# Patient Record
Sex: FEMALE | Race: BLACK OR AFRICAN AMERICAN | NOT HISPANIC OR LATINO | Employment: UNEMPLOYED | URBAN - METROPOLITAN AREA
[De-identification: names, ages, dates, MRNs, and addresses within clinical notes are randomized per-mention and may not be internally consistent; named-entity substitution may affect disease eponyms.]

---

## 2023-03-28 LAB
ALANINE AMINOTRANSFERASE (SGPT) (U/L) IN SER/PLAS: 10 U/L (ref 7–45)
ALBUMIN (G/DL) IN SER/PLAS: 4.2 G/DL (ref 3.4–5)
ALKALINE PHOSPHATASE (U/L) IN SER/PLAS: 101 U/L (ref 33–110)
ANION GAP IN SER/PLAS: 13 MMOL/L (ref 10–20)
ASPARTATE AMINOTRANSFERASE (SGOT) (U/L) IN SER/PLAS: 14 U/L (ref 9–39)
BILIRUBIN TOTAL (MG/DL) IN SER/PLAS: 0.3 MG/DL (ref 0–1.2)
CALCIUM (MG/DL) IN SER/PLAS: 9.4 MG/DL (ref 8.6–10.6)
CARBON DIOXIDE, TOTAL (MMOL/L) IN SER/PLAS: 27 MMOL/L (ref 21–32)
CHLORIDE (MMOL/L) IN SER/PLAS: 104 MMOL/L (ref 98–107)
CHOLESTEROL (MG/DL) IN SER/PLAS: 163 MG/DL (ref 0–199)
CHOLESTEROL IN HDL (MG/DL) IN SER/PLAS: 44.1 MG/DL
CHOLESTEROL/HDL RATIO: 3.7
CREATININE (MG/DL) IN SER/PLAS: 0.65 MG/DL (ref 0.5–1.05)
ERYTHROCYTE DISTRIBUTION WIDTH (RATIO) BY AUTOMATED COUNT: 13.6 % (ref 11.5–14.5)
ERYTHROCYTE MEAN CORPUSCULAR HEMOGLOBIN CONCENTRATION (G/DL) BY AUTOMATED: 30.9 G/DL (ref 32–36)
ERYTHROCYTE MEAN CORPUSCULAR VOLUME (FL) BY AUTOMATED COUNT: 93 FL (ref 80–100)
ERYTHROCYTES (10*6/UL) IN BLOOD BY AUTOMATED COUNT: 4.21 X10E12/L (ref 4–5.2)
ESTIMATED AVERAGE GLUCOSE FOR HBA1C: 134 MG/DL
GFR FEMALE: >90 ML/MIN/1.73M2
GLUCOSE (MG/DL) IN SER/PLAS: 102 MG/DL (ref 74–99)
HEMATOCRIT (%) IN BLOOD BY AUTOMATED COUNT: 39.1 % (ref 36–46)
HEMOGLOBIN (G/DL) IN BLOOD: 12.1 G/DL (ref 12–16)
HEMOGLOBIN A1C/HEMOGLOBIN TOTAL IN BLOOD: 6.3 %
HEPATITIS C VIRUS AB PRESENCE IN SERUM: NONREACTIVE
HIV 1/ 2 AG/AB SCREEN: NONREACTIVE
LDL: 100 MG/DL (ref 0–99)
LEUKOCYTES (10*3/UL) IN BLOOD BY AUTOMATED COUNT: 6.1 X10E9/L (ref 4.4–11.3)
NRBC (PER 100 WBCS) BY AUTOMATED COUNT: 0 /100 WBC (ref 0–0)
PLATELETS (10*3/UL) IN BLOOD AUTOMATED COUNT: 379 X10E9/L (ref 150–450)
POTASSIUM (MMOL/L) IN SER/PLAS: 4.1 MMOL/L (ref 3.5–5.3)
PROTEIN TOTAL: 7.6 G/DL (ref 6.4–8.2)
SODIUM (MMOL/L) IN SER/PLAS: 140 MMOL/L (ref 136–145)
TRIGLYCERIDE (MG/DL) IN SER/PLAS: 96 MG/DL (ref 0–149)
UREA NITROGEN (MG/DL) IN SER/PLAS: 6 MG/DL (ref 6–23)
VLDL: 19 MG/DL (ref 0–40)

## 2023-09-28 PROBLEM — I63.512 ACUTE ISCHEMIC LEFT MCA STROKE (MULTI): Status: ACTIVE | Noted: 2023-09-28

## 2023-09-28 PROBLEM — F17.200 TOBACCO USE DISORDER: Status: ACTIVE | Noted: 2023-09-28

## 2023-09-28 PROBLEM — G57.12 MERALGIA PARESTHETICA OF LEFT SIDE: Status: ACTIVE | Noted: 2023-09-28

## 2023-09-28 PROBLEM — R43.2 LOSS OF TASTE: Status: ACTIVE | Noted: 2023-09-28

## 2023-09-28 PROBLEM — I69.398 GAIT DISTURBANCE, POST-STROKE: Status: ACTIVE | Noted: 2023-09-28

## 2023-09-28 PROBLEM — Z12.11 ENCOUNTER FOR SCREENING COLONOSCOPY: Status: ACTIVE | Noted: 2023-09-28

## 2023-09-28 PROBLEM — R21 SKIN RASH: Status: ACTIVE | Noted: 2023-09-28

## 2023-09-28 PROBLEM — M79.2 PAIN, NEUROPATHIC: Status: ACTIVE | Noted: 2023-09-28

## 2023-09-28 PROBLEM — M62.838 MUSCLE SPASM, NOCTURNAL: Status: ACTIVE | Noted: 2023-09-28

## 2023-09-28 PROBLEM — R73.03 PREDIABETES: Status: ACTIVE | Noted: 2023-09-28

## 2023-09-28 PROBLEM — I10 ESSENTIAL HYPERTENSION: Status: ACTIVE | Noted: 2023-09-28

## 2023-09-28 PROBLEM — K21.9 GERD (GASTROESOPHAGEAL REFLUX DISEASE): Status: ACTIVE | Noted: 2023-09-28

## 2023-09-28 PROBLEM — N91.2 AMENORRHEA: Status: ACTIVE | Noted: 2023-09-28

## 2023-09-28 PROBLEM — R26.9 GAIT DISTURBANCE, POST-STROKE: Status: ACTIVE | Noted: 2023-09-28

## 2023-09-28 PROBLEM — F11.90 OPIOID USE DISORDER: Status: ACTIVE | Noted: 2023-09-28

## 2023-09-28 PROBLEM — I69.920 APHASIA DUE TO LATE EFFECTS OF CEREBROVASCULAR DISEASE: Status: ACTIVE | Noted: 2023-09-28

## 2023-09-28 PROBLEM — M25.562 PAIN IN BOTH KNEES: Status: ACTIVE | Noted: 2023-09-28

## 2023-09-28 PROBLEM — R05.9 COUGH: Status: ACTIVE | Noted: 2023-09-28

## 2023-09-28 PROBLEM — M25.511 SHOULDER PAIN, BILATERAL: Status: ACTIVE | Noted: 2023-09-28

## 2023-09-28 PROBLEM — E78.5 DYSLIPIDEMIA: Status: ACTIVE | Noted: 2023-09-28

## 2023-09-28 PROBLEM — M25.561 PAIN IN BOTH KNEES: Status: ACTIVE | Noted: 2023-09-28

## 2023-09-28 PROBLEM — M54.50 BILATERAL LOW BACK PAIN: Status: ACTIVE | Noted: 2023-09-28

## 2023-09-28 PROBLEM — F41.0 PANIC ATTACKS: Status: ACTIVE | Noted: 2023-09-28

## 2023-09-28 PROBLEM — D68.59 HYPERCOAGULOPATHY (MULTI): Status: ACTIVE | Noted: 2023-09-28

## 2023-09-28 PROBLEM — R43.0 LOSS OF SMELL: Status: ACTIVE | Noted: 2023-09-28

## 2023-09-28 PROBLEM — I69.359 HEMIPARESIS AFFECTING DOMINANT SIDE AS LATE EFFECT OF STROKE (MULTI): Status: ACTIVE | Noted: 2023-09-28

## 2023-09-28 PROBLEM — F41.9 ANXIETY: Status: ACTIVE | Noted: 2023-09-28

## 2023-09-28 PROBLEM — M25.512 SHOULDER PAIN, BILATERAL: Status: ACTIVE | Noted: 2023-09-28

## 2023-09-28 PROBLEM — N64.4 BREAST PAIN: Status: ACTIVE | Noted: 2023-09-28

## 2023-09-28 RX ORDER — IBUPROFEN 200 MG
1 TABLET ORAL EVERY 24 HOURS
COMMUNITY
Start: 2020-10-22

## 2023-09-28 RX ORDER — FOLIC ACID 1 MG/1
1 TABLET ORAL DAILY
COMMUNITY
Start: 2022-06-13 | End: 2023-10-09 | Stop reason: SDUPTHER

## 2023-09-28 RX ORDER — BUPRENORPHINE HYDROCHLORIDE AND NALOXONE HYDROCHLORIDE DIHYDRATE 8; 2 MG/1; MG/1
1 TABLET SUBLINGUAL 2 TIMES DAILY
COMMUNITY

## 2023-09-28 RX ORDER — MICONAZOLE NITRATE 2 %
2 CREAM (GRAM) TOPICAL AS NEEDED
COMMUNITY
Start: 2021-04-30

## 2023-09-28 RX ORDER — ATORVASTATIN CALCIUM 40 MG/1
1 TABLET, FILM COATED ORAL DAILY
COMMUNITY
End: 2023-10-09 | Stop reason: SDUPTHER

## 2023-09-28 RX ORDER — LISINOPRIL 40 MG/1
1 TABLET ORAL DAILY
COMMUNITY
Start: 2022-09-12 | End: 2023-10-09

## 2023-09-28 RX ORDER — GABAPENTIN 100 MG/1
100 CAPSULE ORAL 3 TIMES DAILY
COMMUNITY
Start: 2023-04-26 | End: 2023-10-02 | Stop reason: SDUPTHER

## 2023-09-28 RX ORDER — GABAPENTIN 800 MG/1
1 TABLET ORAL 3 TIMES DAILY
COMMUNITY
Start: 2022-01-07 | End: 2023-10-09 | Stop reason: SDUPTHER

## 2023-09-28 RX ORDER — CALCIUM CARB/MAGNESIUM CARB 311-232MG
1 TABLET ORAL NIGHTLY
COMMUNITY
End: 2023-10-09

## 2023-09-28 RX ORDER — PANTOPRAZOLE SODIUM 40 MG/1
1 TABLET, DELAYED RELEASE ORAL DAILY
COMMUNITY
Start: 2021-12-13 | End: 2023-10-09 | Stop reason: ALTCHOICE

## 2023-09-28 RX ORDER — QUETIAPINE FUMARATE 25 MG/1
25 TABLET, FILM COATED ORAL EVERY 6 HOURS
COMMUNITY
Start: 2021-12-13 | End: 2023-10-09 | Stop reason: SDUPTHER

## 2023-09-28 RX ORDER — ACETAMINOPHEN 500 MG
5 TABLET ORAL NIGHTLY PRN
COMMUNITY
Start: 2020-10-20 | End: 2023-10-09 | Stop reason: SDUPTHER

## 2023-09-28 RX ORDER — IBUPROFEN 200 MG
1 TABLET ORAL
COMMUNITY
Start: 2020-11-11 | End: 2023-10-09

## 2023-09-28 RX ORDER — ALBUTEROL SULFATE 90 UG/1
1-2 AEROSOL, METERED RESPIRATORY (INHALATION) EVERY 6 HOURS PRN
COMMUNITY
Start: 2020-09-10 | End: 2023-10-09 | Stop reason: SDUPTHER

## 2023-09-28 RX ORDER — CHLORHEXIDINE GLUCONATE ORAL RINSE 1.2 MG/ML
15 SOLUTION DENTAL 2 TIMES DAILY
COMMUNITY
Start: 2023-09-26 | End: 2023-10-09 | Stop reason: ALTCHOICE

## 2023-09-28 RX ORDER — MULTIVITAMIN WITH FOLIC ACID 400 MCG
1 TABLET ORAL DAILY
COMMUNITY
Start: 2021-02-22 | End: 2023-10-09

## 2023-09-28 RX ORDER — ASPIRIN 81 MG/1
1 TABLET ORAL DAILY
COMMUNITY
Start: 2020-11-11 | End: 2023-10-09 | Stop reason: SDUPTHER

## 2023-09-28 RX ORDER — SERTRALINE HYDROCHLORIDE 100 MG/1
1 TABLET, FILM COATED ORAL DAILY
COMMUNITY
Start: 2021-12-13 | End: 2023-10-09 | Stop reason: SDUPTHER

## 2023-09-28 RX ORDER — BACLOFEN 10 MG/1
1 TABLET ORAL 3 TIMES DAILY
COMMUNITY
End: 2023-10-09 | Stop reason: SDUPTHER

## 2023-09-28 RX ORDER — CLINDAMYCIN PHOSPHATE 10 UG/ML
1 LOTION TOPICAL
COMMUNITY
Start: 2022-07-26 | End: 2023-10-09 | Stop reason: WASHOUT

## 2023-09-28 RX ORDER — AMOXICILLIN AND CLAVULANATE POTASSIUM 500; 125 MG/1; MG/1
1 TABLET, FILM COATED ORAL 3 TIMES DAILY
COMMUNITY
Start: 2023-09-26 | End: 2023-10-09 | Stop reason: WASHOUT

## 2023-09-28 RX ORDER — BUSPIRONE HYDROCHLORIDE 10 MG/1
20 TABLET ORAL 2 TIMES DAILY
COMMUNITY
End: 2023-10-09 | Stop reason: SDUPTHER

## 2023-09-28 RX ORDER — GABAPENTIN 600 MG/1
600 TABLET ORAL 3 TIMES DAILY
COMMUNITY
End: 2023-10-09 | Stop reason: WASHOUT

## 2023-09-28 RX ORDER — BUSPIRONE HYDROCHLORIDE 15 MG/1
1 TABLET ORAL EVERY 12 HOURS
COMMUNITY
End: 2023-10-09 | Stop reason: SDUPTHER

## 2023-09-28 RX ORDER — POLYETHYLENE GLYCOL 3350, SODIUM CHLORIDE, SODIUM BICARBONATE, POTASSIUM CHLORIDE 420; 11.2; 5.72; 1.48 G/4L; G/4L; G/4L; G/4L
4000 POWDER, FOR SOLUTION ORAL ONCE
COMMUNITY
Start: 2022-06-09 | End: 2023-10-31 | Stop reason: SDUPTHER

## 2023-09-28 RX ORDER — MULTIVITAMIN
1 TABLET ORAL DAILY
COMMUNITY

## 2023-09-28 RX ORDER — HYDROXYZINE PAMOATE 25 MG/1
25 CAPSULE ORAL EVERY 8 HOURS
COMMUNITY
Start: 2020-09-10 | End: 2023-10-09 | Stop reason: ENTERED-IN-ERROR

## 2023-10-02 ENCOUNTER — TELEPHONE (OUTPATIENT)
Dept: PRIMARY CARE | Facility: HOSPITAL | Age: 48
End: 2023-10-02
Payer: COMMERCIAL

## 2023-10-02 DIAGNOSIS — G57.12 MERALGIA PARESTHETICA OF LEFT SIDE: Primary | ICD-10-CM

## 2023-10-02 RX ORDER — GABAPENTIN 100 MG/1
300 CAPSULE ORAL NIGHTLY
Qty: 7 CAPSULE | Refills: 0 | Status: SHIPPED | OUTPATIENT
Start: 2023-10-02 | End: 2023-10-09

## 2023-10-06 PROBLEM — E66.812 CLASS 2 SEVERE OBESITY DUE TO EXCESS CALORIES WITH SERIOUS COMORBIDITY AND BODY MASS INDEX (BMI) OF 38.0 TO 38.9 IN ADULT: Status: ACTIVE | Noted: 2023-10-06

## 2023-10-06 PROBLEM — G62.9 NEUROPATHY: Status: ACTIVE | Noted: 2023-10-06

## 2023-10-06 PROBLEM — J31.0 CHRONIC RHINITIS: Status: ACTIVE | Noted: 2023-10-06

## 2023-10-06 PROBLEM — B37.9 YEAST INFECTION: Status: ACTIVE | Noted: 2023-10-06

## 2023-10-06 PROBLEM — J30.0 CHRONIC VASOMOTOR RHINITIS: Status: ACTIVE | Noted: 2023-10-06

## 2023-10-06 PROBLEM — Z87.898 HISTORY OF CHRONIC COUGH: Status: ACTIVE | Noted: 2023-10-06

## 2023-10-06 PROBLEM — E66.01 CLASS 2 SEVERE OBESITY DUE TO EXCESS CALORIES WITH SERIOUS COMORBIDITY AND BODY MASS INDEX (BMI) OF 38.0 TO 38.9 IN ADULT (MULTI): Status: ACTIVE | Noted: 2023-10-06

## 2023-10-06 RX ORDER — CARISOPRODOL 350 MG/1
350 TABLET ORAL 2 TIMES DAILY
COMMUNITY
Start: 2023-09-16 | End: 2024-01-25 | Stop reason: WASHOUT

## 2023-10-06 RX ORDER — IPRATROPIUM BROMIDE 42 UG/1
SPRAY, METERED NASAL
COMMUNITY
Start: 2023-09-30 | End: 2023-10-09 | Stop reason: SDUPTHER

## 2023-10-06 RX ORDER — GABAPENTIN 300 MG/1
CAPSULE ORAL
COMMUNITY
Start: 2023-08-31 | End: 2023-10-09 | Stop reason: SDUPTHER

## 2023-10-06 RX ORDER — LISINOPRIL 20 MG/1
20 TABLET ORAL DAILY
COMMUNITY
Start: 2023-09-19 | End: 2023-10-09 | Stop reason: SDUPTHER

## 2023-10-06 RX ORDER — PRENATAL VIT 91/IRON/FOLIC/DHA 28-975-200
COMBINATION PACKAGE (EA) ORAL
COMMUNITY
Start: 2023-08-31 | End: 2023-10-09

## 2023-10-09 ENCOUNTER — OFFICE VISIT (OUTPATIENT)
Dept: PRIMARY CARE | Facility: HOSPITAL | Age: 48
End: 2023-10-09
Payer: COMMERCIAL

## 2023-10-09 VITALS
HEART RATE: 91 BPM | WEIGHT: 231.9 LBS | HEIGHT: 67 IN | OXYGEN SATURATION: 95 % | TEMPERATURE: 96.4 F | DIASTOLIC BLOOD PRESSURE: 75 MMHG | SYSTOLIC BLOOD PRESSURE: 120 MMHG | BODY MASS INDEX: 36.4 KG/M2

## 2023-10-09 DIAGNOSIS — J34.2 DEVIATED SEPTUM: ICD-10-CM

## 2023-10-09 DIAGNOSIS — I10 ESSENTIAL HYPERTENSION: Primary | ICD-10-CM

## 2023-10-09 DIAGNOSIS — Z12.4 CERVICAL CANCER SCREENING: ICD-10-CM

## 2023-10-09 DIAGNOSIS — M79.2 NEUROPATHIC PAIN: ICD-10-CM

## 2023-10-09 DIAGNOSIS — G47.00 INSOMNIA, UNSPECIFIED TYPE: ICD-10-CM

## 2023-10-09 DIAGNOSIS — J98.01 BRONCHOSPASM: ICD-10-CM

## 2023-10-09 DIAGNOSIS — M25.519 CHRONIC SHOULDER PAIN, UNSPECIFIED LATERALITY: ICD-10-CM

## 2023-10-09 DIAGNOSIS — E78.5 DYSLIPIDEMIA: ICD-10-CM

## 2023-10-09 DIAGNOSIS — F41.9 ANXIETY: ICD-10-CM

## 2023-10-09 DIAGNOSIS — I63.312 CEREBROVASCULAR ACCIDENT (CVA) DUE TO THROMBOSIS OF LEFT MIDDLE CEREBRAL ARTERY (MULTI): ICD-10-CM

## 2023-10-09 DIAGNOSIS — F17.200 TOBACCO USE DISORDER: ICD-10-CM

## 2023-10-09 DIAGNOSIS — G89.29 CHRONIC SHOULDER PAIN, UNSPECIFIED LATERALITY: ICD-10-CM

## 2023-10-09 PROCEDURE — 3008F BODY MASS INDEX DOCD: CPT | Performed by: STUDENT IN AN ORGANIZED HEALTH CARE EDUCATION/TRAINING PROGRAM

## 2023-10-09 PROCEDURE — 3074F SYST BP LT 130 MM HG: CPT | Performed by: STUDENT IN AN ORGANIZED HEALTH CARE EDUCATION/TRAINING PROGRAM

## 2023-10-09 PROCEDURE — 99215 OFFICE O/P EST HI 40 MIN: CPT | Performed by: STUDENT IN AN ORGANIZED HEALTH CARE EDUCATION/TRAINING PROGRAM

## 2023-10-09 PROCEDURE — 3078F DIAST BP <80 MM HG: CPT | Performed by: STUDENT IN AN ORGANIZED HEALTH CARE EDUCATION/TRAINING PROGRAM

## 2023-10-09 PROCEDURE — 99215 OFFICE O/P EST HI 40 MIN: CPT | Mod: GC | Performed by: STUDENT IN AN ORGANIZED HEALTH CARE EDUCATION/TRAINING PROGRAM

## 2023-10-09 PROCEDURE — G0463 HOSPITAL OUTPT CLINIC VISIT: HCPCS | Performed by: STUDENT IN AN ORGANIZED HEALTH CARE EDUCATION/TRAINING PROGRAM

## 2023-10-09 RX ORDER — FOLIC ACID 1 MG/1
1 TABLET ORAL DAILY
Qty: 30 TABLET | Refills: 6 | Status: SHIPPED | OUTPATIENT
Start: 2023-10-09 | End: 2024-06-03

## 2023-10-09 RX ORDER — LISINOPRIL 20 MG/1
20 TABLET ORAL DAILY
Qty: 30 TABLET | Refills: 6 | Status: SHIPPED | OUTPATIENT
Start: 2023-10-09 | End: 2024-01-25 | Stop reason: SDUPTHER

## 2023-10-09 RX ORDER — SERTRALINE HYDROCHLORIDE 100 MG/1
100 TABLET, FILM COATED ORAL DAILY
Qty: 30 TABLET | Refills: 6 | Status: SHIPPED | OUTPATIENT
Start: 2023-10-09 | End: 2024-01-16 | Stop reason: SDUPTHER

## 2023-10-09 RX ORDER — QUETIAPINE FUMARATE 25 MG/1
12.5 TABLET, FILM COATED ORAL DAILY
Qty: 30 TABLET | Refills: 6 | Status: SHIPPED | OUTPATIENT
Start: 2023-10-09 | End: 2024-01-25 | Stop reason: WASHOUT

## 2023-10-09 RX ORDER — GABAPENTIN 800 MG/1
800 TABLET ORAL 3 TIMES DAILY
Qty: 90 TABLET | Refills: 6 | Status: SHIPPED | OUTPATIENT
Start: 2023-10-09 | End: 2024-01-25 | Stop reason: SDUPTHER

## 2023-10-09 RX ORDER — VARENICLINE TARTRATE 1 MG/1
TABLET, FILM COATED ORAL
Qty: 66 TABLET | Refills: 3 | Status: SHIPPED | OUTPATIENT
Start: 2023-10-09 | End: 2024-01-25

## 2023-10-09 RX ORDER — ACETAMINOPHEN 500 MG
5 TABLET ORAL NIGHTLY PRN
Qty: 30 TABLET | Refills: 6 | Status: SHIPPED | OUTPATIENT
Start: 2023-10-09 | End: 2024-01-25 | Stop reason: SDUPTHER

## 2023-10-09 RX ORDER — BACLOFEN 10 MG/1
10 TABLET ORAL 3 TIMES DAILY
Qty: 90 TABLET | Refills: 6 | Status: SHIPPED | OUTPATIENT
Start: 2023-10-09 | End: 2024-01-25 | Stop reason: SDUPTHER

## 2023-10-09 RX ORDER — IPRATROPIUM BROMIDE 42 UG/1
2 SPRAY, METERED NASAL 3 TIMES DAILY
Qty: 15 ML | Refills: 0 | Status: SHIPPED | OUTPATIENT
Start: 2023-10-09 | End: 2023-12-05

## 2023-10-09 RX ORDER — BUSPIRONE HYDROCHLORIDE 10 MG/1
20 TABLET ORAL 2 TIMES DAILY
Qty: 60 TABLET | Refills: 6 | Status: SHIPPED | OUTPATIENT
Start: 2023-10-09

## 2023-10-09 RX ORDER — ASPIRIN 81 MG/1
81 TABLET ORAL DAILY
Qty: 30 TABLET | Refills: 6 | Status: SHIPPED | OUTPATIENT
Start: 2023-10-09 | End: 2024-01-25 | Stop reason: SDUPTHER

## 2023-10-09 RX ORDER — ALBUTEROL SULFATE 90 UG/1
1-2 AEROSOL, METERED RESPIRATORY (INHALATION) EVERY 6 HOURS PRN
Qty: 18 G | Refills: 6 | Status: SHIPPED | OUTPATIENT
Start: 2023-10-09

## 2023-10-09 RX ORDER — ATORVASTATIN CALCIUM 40 MG/1
40 TABLET, FILM COATED ORAL DAILY
Qty: 30 TABLET | Refills: 6 | Status: SHIPPED | OUTPATIENT
Start: 2023-10-09 | End: 2024-01-25 | Stop reason: SDUPTHER

## 2023-10-09 RX ORDER — GABAPENTIN 300 MG/1
300 CAPSULE ORAL NIGHTLY
Qty: 30 CAPSULE | Refills: 6 | Status: SHIPPED | OUTPATIENT
Start: 2023-10-09 | End: 2024-01-25 | Stop reason: SDUPTHER

## 2023-10-09 ASSESSMENT — COLUMBIA-SUICIDE SEVERITY RATING SCALE - C-SSRS
6. HAVE YOU EVER DONE ANYTHING, STARTED TO DO ANYTHING, OR PREPARED TO DO ANYTHING TO END YOUR LIFE?: NO
2. HAVE YOU ACTUALLY HAD ANY THOUGHTS OF KILLING YOURSELF?: NO
1. IN THE PAST MONTH, HAVE YOU WISHED YOU WERE DEAD OR WISHED YOU COULD GO TO SLEEP AND NOT WAKE UP?: NO

## 2023-10-09 ASSESSMENT — PATIENT HEALTH QUESTIONNAIRE - PHQ9
2. FEELING DOWN, DEPRESSED OR HOPELESS: NOT AT ALL
1. LITTLE INTEREST OR PLEASURE IN DOING THINGS: NOT AT ALL
SUM OF ALL RESPONSES TO PHQ9 QUESTIONS 1 AND 2: 0

## 2023-10-09 ASSESSMENT — LIFESTYLE VARIABLES
HOW OFTEN DO YOU HAVE SIX OR MORE DRINKS ON ONE OCCASION: NEVER
SKIP TO QUESTIONS 9-10: 1
HOW OFTEN DO YOU HAVE A DRINK CONTAINING ALCOHOL: NEVER
AUDIT-C TOTAL SCORE: 0
HOW MANY STANDARD DRINKS CONTAINING ALCOHOL DO YOU HAVE ON A TYPICAL DAY: PATIENT DOES NOT DRINK

## 2023-10-09 ASSESSMENT — ENCOUNTER SYMPTOMS
OCCASIONAL FEELINGS OF UNSTEADINESS: 0
DEPRESSION: 0
LOSS OF SENSATION IN FEET: 0

## 2023-10-09 NOTE — PATIENT INSTRUCTIONS
Ines Ms. Rhodes, it was a pleasure seeing you today! Today we talked about several important aspects of your health.    1) You have a done a great job so far cutting down on smoking, but the most important thing for your health right now would be to quit all together. Please continue to use nicotine lozenges/patches and start Chantix. Please take 0.5 mg daily for 3 days, then take 0.5 mg twice per day for the next 4 days, then take 1 mg twice per day.     2) Please obtain colonoscopy, mammogram, and Pap smear (women's health)    3) Please get your pneumonia vaccine (PCV-20)    4) Please establish care with mental health provider to ensure so that we can optimize your anxiety medications safely.     Please return to see me in clinic in 3-4 months,    All the best,   Dr. Campbell

## 2023-10-09 NOTE — PROGRESS NOTES
"HISTORY OF PRESENT ILLNESS:   Jud Rhodes is a 47 y.o. female with PMHx significant for L MCA CVA (c/b SAH with some residual aphasia), HTN, pre-diabetes, anxiety, and tobacco use disorder who presents to [facility] today for follow up. Pt was last seen in clinic via virtual visit on 08/30/23. Patient reports overall doing well since her last visit. Does state that she has had some non-bloody, watery diarrhea over the last 3-4 days and notes that her grand-daughter had similar symptoms recently. States that she has been eating and drinking well and denies feeling excessive tiredness or feeling dehydrated. Denies HA, CP, SOB, abd pain, N/V.     Patient states that she is continuing to smoke ~1 PPD and is motivated to quit. Requests \"medical marijuana referral\". Patient expresses concern that she has not received enough refills on many of her medications and wishes to ensure that she stay on them. Also expresses concern about parking costs and requests to get through visit as quickly as possible.     ROS: 12 point ROS negative unless as per HPI     Patient Active Problem List    Diagnosis Date Noted    Chronic rhinitis 10/06/2023    Chronic vasomotor rhinitis 10/06/2023    Class 2 severe obesity due to excess calories with serious comorbidity and body mass index (BMI) of 38.0 to 38.9 in adult (CMS/[facility]) 10/06/2023    History of chronic cough 10/06/2023    Neuropathy 10/06/2023    Yeast infection 10/06/2023    Amenorrhea 09/28/2023    Anxiety 09/28/2023    Aphasia due to late effects of cerebrovascular disease 09/28/2023    Bilateral low back pain 09/28/2023    Breast pain 09/28/2023    Cough 09/28/2023    Dyslipidemia 09/28/2023    Essential hypertension 09/28/2023    GERD (gastroesophageal reflux disease) 09/28/2023    Hemiparesis affecting dominant side as late effect of stroke (CMS/[facility]) 09/28/2023    Hypercoagulopathy (CMS/[facility]) 09/28/2023    Loss of smell 09/28/2023    Loss of taste 09/28/2023    Meralgia " paresthetica of left side 09/28/2023    Muscle spasm, nocturnal 09/28/2023    Opioid use disorder 09/28/2023    Pain in both knees 09/28/2023    Pain, neuropathic 09/28/2023    Panic attacks 09/28/2023    Prediabetes 09/28/2023    Shoulder pain, bilateral 09/28/2023    Skin rash 09/28/2023    Tobacco use disorder 09/28/2023    Acute ischemic left MCA stroke (CMS/HCC) 09/28/2023    Encounter for screening colonoscopy 09/28/2023    Gait disturbance, post-stroke 09/28/2023        Current Outpatient Medications:     albuterol 90 mcg/actuation inhaler, Inhale 1-2 puffs every 6 hours if needed for shortness of breath. 4-6 hours as needed, Disp: 18 g, Rfl: 6    aspirin 81 mg EC tablet, Take 1 tablet (81 mg) by mouth once daily., Disp: 30 tablet, Rfl: 6    baclofen (Lioresal) 10 mg tablet, Take 1 tablet (10 mg) by mouth 3 times a day., Disp: 90 tablet, Rfl: 6    carisoprodol (Soma) 350 mg tablet, Take 1 tablet (350 mg) by mouth 2 times a day., Disp: , Rfl:     folic acid (Folvite) 1 mg tablet, Take 1 tablet (1 mg) by mouth once daily., Disp: 30 tablet, Rfl: 6    multivitamin (Daily Multi-Vitamin) tablet, Take 1 tablet by mouth once daily., Disp: , Rfl:     nicotine (Nicoderm CQ) 21 mg/24 hr patch, Place 1 patch on the skin once every 24 hours., Disp: , Rfl:     nicotine polacrilex (Nicorette) 2 mg gum, Take 1 each (2 mg) by mouth if needed for smoking cessation., Disp: , Rfl:     polyethylene glycol-electrolytes 420 gram solution, Take 4,000 mL by mouth 1 time., Disp: , Rfl:     QUEtiapine (SEROquel) 25 mg tablet, Take 0.5 tablets (12.5 mg) by mouth once daily., Disp: 30 tablet, Rfl: 6    sertraline (Zoloft) 100 mg tablet, Take 1 tablet (100 mg) by mouth once daily., Disp: 30 tablet, Rfl: 6    atorvastatin (Lipitor) 40 mg tablet, Take 1 tablet (40 mg) by mouth once daily., Disp: 30 tablet, Rfl: 6    buprenorphine-naloxone (Suboxone) 8-2 mg SL tablet, Place 1 tablet under the tongue twice a day., Disp: , Rfl:     busPIRone  (Buspar) 10 mg tablet, Take 2 tablets (20 mg) by mouth 2 times a day., Disp: 60 tablet, Rfl: 6    gabapentin (Neurontin) 300 mg capsule, Take 1 capsule (300 mg) by mouth once daily at bedtime., Disp: 30 capsule, Rfl: 6    gabapentin (Neurontin) 800 mg tablet, Take 1 tablet (800 mg) by mouth 3 times a day., Disp: 90 tablet, Rfl: 6    ipratropium (Atrovent) 42 mcg (0.06 %) nasal spray, Administer 2 sprays into each nostril 3 times a day., Disp: 15 mL, Rfl: 0    lisinopril 20 mg tablet, Take 1 tablet (20 mg) by mouth once daily., Disp: 30 tablet, Rfl: 6    melatonin 5 mg tablet, Take 1 tablet (5 mg) by mouth as needed at bedtime for sleep., Disp: 30 tablet, Rfl: 6    varenicline (Chantix) 1 mg tablet, Take 0.5 tablets (0.5 mg) by mouth once daily for 3 days, THEN 0.5 tablets (0.5 mg) 2 times a day for 4 days, THEN 1 tablet (1 mg) 2 times a day. Take with full glass of water.., Disp: 66 tablet, Rfl: 3    No results found for this or any previous visit (from the past 96 hour(s)).       PHYSICAL EXAM:   General: pt is pleasant, cooperative, in no acute distress  Heart: RRR no murmur, rub or Ed  Lungs: clear to auscultation bilaterally with good airflow throughout. No wheezes or rhonchi.  Abdomen: soft, nontender, nondistended, no apparent mass  Extremities: no edema, R 5th digit well healed.   Skin: no rash or lesions  Psychiatric: mental status and behavior appropriate for situation   Neurologic: Noted to have significant word finding difficulty. Ambulates without difficulty      Musculoskeletal: moving all extremities appropriately.    Assessment and plan:   Jud Rhodes is a 47 y.o. female with PMHx significant for L MCA CVA (c/b SAH with some residual aphasia), HTN, pre-diabetes, anxiety, and tobacco use disorder who presents to [facility] today for follow up.     Emphasis today:  -Smoking cessation: Prescribed Chantix in addition to continuing nicotine replacement therapy.  -Mental Health: Referral to  psychiatry for assistance with anti-anxiety regimen. Has been on seroquel 12.5 mg q6h PRN prior to establishing care at [facility]. Reports taking ~1 pill per day now. Refilled to enough to continue 1 pill per day for now.  -Cancer screening: Mammogram order active, Gyn appointment ordered for cervical cancer screening, and Colonoscopy scheduled for next month  -Recommended that patient receive PCV-20 at next visit.     Address at next visit:  -Weight management  -Address bronchospasm/albuterol use, consider PFTs.  -Discuss sleep symptoms (STOP-BANG) and consider sending for sleep study    #Tobacco Use Disorder  #Bronchospasm  ::Reports currently smoking 1PPD  -Start varenicline 0.5 mg daily x3 days, then 0.5 mg x4 days, then 1 mg BID thereafter  -c/w nicotine patch and gum  -Consider obtaining PFTs at subsequent visits (patient not currently interested)      #Meralgia Paresthetica, controlled  -Encouraged to schedule appt w/ pain management  -Gabapentin 800 mg TID  -Gabapentin 300 mg at bedtime  -Continue baclofen 10 mg TID  -Patient was prescribed Soma previously by unknown physician, not refilled due to side effect profile and addictive potential       #HTN  #HLD  #Pre-diabetes  #Obesity, BMI 30  :BP 120s/80s, controlled. . A1C 6.3%.   -Encouraged f/u with weight management clinic  -C/w lisinopril 20 mg once daily      #CVA, L MCA, stable  -c/w ASA 81 mg  -c/w atorvastatin 40 mg      #GERD?  -Reports no longer taking PPI and symptoms well controlled     #Panic Attacks  #Anxiety  -Sent referral for psychiatry for   -Patient would benefit from EKG to establish baseline Qtc, not obtained today due to patient in hurry to leave   -Buspirone 15 mg BID  -Sertraline 100mg daily  -Seroquel 12.5 mg Q6H    #R 5th toe injury  -Improved significantly on exam. Sees podiatry regularly  -C/w podiatry visits      # Health Maintenance  Cancer Screening  - Colonoscopy: Due now, scheduled 11/2023  - Low dose Chest CT: Start  screening at age 48  - Mammogram: Due now, active order in   - PAP smear: Due now, referral to gynecology active     - Last HbA1c: 6.3% 03/2023     Cardiovascular Screening   - ASCVD risk score: 5.5% 10-year risk  - STOPBANG: Complete at next visit     Infectious disease  - HIV: Negative 2023  - Syphilis: Not tested, obtain upon drawing next labs  - Hepatitis C: Negative 2023     Vaccines   - Influenza: Reports obtaining at pharmacy 2023  - Shingles: Not yet indicated  - Tdap: 2015  - Pneumonia: Needs PCV 20 (smoker), instructed to obtain at pharmacy.   - COVID: x3 vaccines, due for booster     RTC IN 3-4 months

## 2023-10-30 ENCOUNTER — TELEPHONE (OUTPATIENT)
Dept: PRIMARY CARE | Facility: HOSPITAL | Age: 48
End: 2023-10-30
Payer: COMMERCIAL

## 2023-10-31 DIAGNOSIS — Z12.11 ENCOUNTER FOR SCREENING COLONOSCOPY: Primary | ICD-10-CM

## 2023-10-31 RX ORDER — POLYETHYLENE GLYCOL 3350, SODIUM CHLORIDE, SODIUM BICARBONATE, POTASSIUM CHLORIDE 420; 11.2; 5.72; 1.48 G/4L; G/4L; G/4L; G/4L
4000 POWDER, FOR SOLUTION ORAL ONCE
Qty: 4000 ML | Refills: 0 | Status: SHIPPED | OUTPATIENT
Start: 2023-10-31 | End: 2023-10-31

## 2023-11-10 ENCOUNTER — PREP FOR PROCEDURE (OUTPATIENT)
Dept: GASTROENTEROLOGY | Facility: HOSPITAL | Age: 48
End: 2023-11-10
Payer: COMMERCIAL

## 2023-11-14 DIAGNOSIS — Z12.11 COLON CANCER SCREENING: Primary | ICD-10-CM

## 2023-11-14 RX ORDER — POLYETHYLENE GLYCOL 3350, SODIUM CHLORIDE, SODIUM BICARBONATE, POTASSIUM CHLORIDE 420; 11.2; 5.72; 1.48 G/4L; G/4L; G/4L; G/4L
4000 POWDER, FOR SOLUTION ORAL ONCE
Qty: 4000 ML | Refills: 0 | Status: SHIPPED | OUTPATIENT
Start: 2023-11-14 | End: 2023-11-14

## 2023-11-14 NOTE — PROGRESS NOTES
Bowel preparation has been prescribed for patient's upcoming colonoscopy procedure.    Eldon Villafuerte MD  Gastroenterology

## 2023-11-17 ENCOUNTER — ANESTHESIA EVENT (OUTPATIENT)
Dept: GASTROENTEROLOGY | Facility: HOSPITAL | Age: 48
End: 2023-11-17

## 2023-11-17 ENCOUNTER — ANESTHESIA (OUTPATIENT)
Dept: GASTROENTEROLOGY | Facility: HOSPITAL | Age: 48
End: 2023-11-17
Payer: COMMERCIAL

## 2023-12-05 DIAGNOSIS — J34.2 DEVIATED SEPTUM: ICD-10-CM

## 2023-12-05 RX ORDER — IPRATROPIUM BROMIDE 42 UG/1
SPRAY, METERED NASAL
Qty: 15 ML | Refills: 1 | Status: SHIPPED | OUTPATIENT
Start: 2023-12-05 | End: 2024-02-06 | Stop reason: SDUPTHER

## 2023-12-26 PROBLEM — I69.30 HISTORY OF STROKE WITH RESIDUAL EFFECTS: Status: ACTIVE | Noted: 2021-10-23

## 2023-12-26 PROBLEM — I63.9 STROKE (MULTI): Status: ACTIVE | Noted: 2022-10-27

## 2023-12-26 PROBLEM — F11.20 OPIOID DEPENDENCE ON AGONIST THERAPY (MULTI): Status: ACTIVE | Noted: 2022-09-08

## 2023-12-26 PROBLEM — I63.512 CEREBRAL INFARCTION DUE TO UNSPECIFIED OCCLUSION OR STENOSIS OF LEFT MIDDLE CEREBRAL ARTERY (MULTI): Status: ACTIVE | Noted: 2021-05-31

## 2023-12-26 PROBLEM — F10.231 ALCOHOL DEPENDENCE WITH WITHDRAWAL DELIRIUM (MULTI): Status: ACTIVE | Noted: 2021-05-31

## 2023-12-26 PROBLEM — F17.200 NICOTINE DEPENDENCE: Status: ACTIVE | Noted: 2021-05-31

## 2023-12-26 PROBLEM — F14.10 COCAINE ABUSE (MULTI): Status: ACTIVE | Noted: 2019-05-03

## 2023-12-26 PROBLEM — F14.921: Status: ACTIVE | Noted: 2021-05-31

## 2023-12-26 PROBLEM — I69.920 APHASIA FOLLOWING UNSPECIFIED CEREBROVASCULAR DISEASE: Status: ACTIVE | Noted: 2021-05-31

## 2023-12-26 PROBLEM — R07.9 CHEST PAIN: Status: ACTIVE | Noted: 2017-10-21

## 2023-12-26 PROBLEM — F19.11 HISTORY OF DRUG ABUSE IN REMISSION (MULTI): Status: ACTIVE | Noted: 2021-10-23

## 2023-12-26 PROBLEM — F11.10 OPIOID ABUSE (MULTI): Status: ACTIVE | Noted: 2019-05-03

## 2023-12-26 PROBLEM — E78.5 HYPERLIPIDEMIA, UNSPECIFIED: Status: ACTIVE | Noted: 2021-05-31

## 2023-12-26 PROBLEM — F12.10 CANNABIS ABUSE: Status: ACTIVE | Noted: 2019-05-03

## 2023-12-26 PROBLEM — I69.359 HEMIPLEGIA AND HEMIPARESIS FOLLOWING CEREBRAL INFARCTION AFFECTING UNSPECIFIED SIDE (MULTI): Status: ACTIVE | Noted: 2021-05-31

## 2023-12-26 RX ORDER — SILVER SULFADIAZINE 10 G/1000G
CREAM TOPICAL DAILY
COMMUNITY
Start: 2023-10-25

## 2023-12-26 RX ORDER — LIDOCAINE HCL 4 G/100G
CREAM TOPICAL
COMMUNITY
Start: 2021-03-10

## 2023-12-26 RX ORDER — MULTIVITAMIN WITH MINERALS
1 TABLET ORAL DAILY
COMMUNITY

## 2023-12-26 RX ORDER — IPRATROPIUM BROMIDE AND ALBUTEROL SULFATE 2.5; .5 MG/3ML; MG/3ML
3 SOLUTION RESPIRATORY (INHALATION)
COMMUNITY
Start: 2020-10-20

## 2023-12-26 RX ORDER — CEPHALEXIN 500 MG/1
500 CAPSULE ORAL 2 TIMES DAILY
COMMUNITY
Start: 2023-10-25 | End: 2024-01-25 | Stop reason: WASHOUT

## 2023-12-26 RX ORDER — POLYETHYLENE GLYCOL 3350, SODIUM CHLORIDE, SODIUM BICARBONATE, POTASSIUM CHLORIDE 420; 11.2; 5.72; 1.48 G/4L; G/4L; G/4L; G/4L
4000 POWDER, FOR SOLUTION ORAL
COMMUNITY
Start: 2023-11-14

## 2023-12-26 RX ORDER — HYDROCHLOROTHIAZIDE 12.5 MG/1
12.5 TABLET ORAL
COMMUNITY
Start: 2021-10-25 | End: 2024-01-25 | Stop reason: SDUPTHER

## 2023-12-26 RX ORDER — NALOXONE HYDROCHLORIDE 4 MG/.1ML
4 SPRAY NASAL
COMMUNITY
Start: 2021-06-25

## 2023-12-26 RX ORDER — BUPRENORPHINE AND NALOXONE 8; 2 MG/1; MG/1
FILM, SOLUBLE BUCCAL; SUBLINGUAL
COMMUNITY
Start: 2023-11-29

## 2023-12-26 RX ORDER — VARENICLINE TARTRATE 0.5 (11)-1
KIT ORAL
COMMUNITY
Start: 2023-05-22 | End: 2024-01-25

## 2023-12-26 RX ORDER — AMMONIUM LACTATE 12 G/100G
CREAM TOPICAL 2 TIMES DAILY
COMMUNITY
Start: 2023-11-22

## 2023-12-26 RX ORDER — PANTOPRAZOLE SODIUM 40 MG/1
40 TABLET, DELAYED RELEASE ORAL
COMMUNITY
Start: 2023-11-21 | End: 2024-01-16

## 2024-01-16 DIAGNOSIS — F41.9 ANXIETY: ICD-10-CM

## 2024-01-16 DIAGNOSIS — R10.13 DYSPEPSIA: ICD-10-CM

## 2024-01-16 DIAGNOSIS — R10.13 DYSPEPSIA: Primary | ICD-10-CM

## 2024-01-16 RX ORDER — SERTRALINE HYDROCHLORIDE 100 MG/1
100 TABLET, FILM COATED ORAL DAILY
Qty: 30 TABLET | Refills: 0 | Status: SHIPPED | OUTPATIENT
Start: 2024-01-16

## 2024-01-16 RX ORDER — PANTOPRAZOLE SODIUM 40 MG/1
40 TABLET, DELAYED RELEASE ORAL DAILY
Qty: 30 TABLET | Refills: 3 | Status: SHIPPED | OUTPATIENT
Start: 2024-01-16 | End: 2024-01-16 | Stop reason: SDUPTHER

## 2024-01-16 RX ORDER — PANTOPRAZOLE SODIUM 40 MG/1
40 TABLET, DELAYED RELEASE ORAL DAILY
Qty: 30 TABLET | Refills: 0 | Status: SHIPPED | OUTPATIENT
Start: 2024-01-16 | End: 2024-05-23 | Stop reason: SDUPTHER

## 2024-01-25 ENCOUNTER — OFFICE VISIT (OUTPATIENT)
Dept: PRIMARY CARE | Facility: HOSPITAL | Age: 49
End: 2024-01-25
Payer: COMMERCIAL

## 2024-01-25 VITALS
HEART RATE: 90 BPM | WEIGHT: 229.5 LBS | SYSTOLIC BLOOD PRESSURE: 137 MMHG | TEMPERATURE: 96.6 F | BODY MASS INDEX: 36.02 KG/M2 | DIASTOLIC BLOOD PRESSURE: 78 MMHG | HEIGHT: 67 IN | OXYGEN SATURATION: 93 %

## 2024-01-25 DIAGNOSIS — I63.312 CEREBROVASCULAR ACCIDENT (CVA) DUE TO THROMBOSIS OF LEFT MIDDLE CEREBRAL ARTERY (MULTI): ICD-10-CM

## 2024-01-25 DIAGNOSIS — E78.5 DYSLIPIDEMIA: ICD-10-CM

## 2024-01-25 DIAGNOSIS — G47.00 INSOMNIA, UNSPECIFIED TYPE: ICD-10-CM

## 2024-01-25 DIAGNOSIS — Z00.00 HEALTHCARE MAINTENANCE: ICD-10-CM

## 2024-01-25 DIAGNOSIS — G89.29 CHRONIC SHOULDER PAIN, UNSPECIFIED LATERALITY: ICD-10-CM

## 2024-01-25 DIAGNOSIS — M25.519 CHRONIC SHOULDER PAIN, UNSPECIFIED LATERALITY: ICD-10-CM

## 2024-01-25 DIAGNOSIS — I10 ESSENTIAL HYPERTENSION: ICD-10-CM

## 2024-01-25 DIAGNOSIS — R32 URINARY INCONTINENCE, UNSPECIFIED TYPE: ICD-10-CM

## 2024-01-25 DIAGNOSIS — Z12.2 SCREENING FOR LUNG CANCER: Primary | ICD-10-CM

## 2024-01-25 DIAGNOSIS — R09.82 PND (POST-NASAL DRIP): ICD-10-CM

## 2024-01-25 DIAGNOSIS — M79.2 NEUROPATHIC PAIN: ICD-10-CM

## 2024-01-25 PROCEDURE — 99215 OFFICE O/P EST HI 40 MIN: CPT | Mod: GC | Performed by: STUDENT IN AN ORGANIZED HEALTH CARE EDUCATION/TRAINING PROGRAM

## 2024-01-25 PROCEDURE — 3078F DIAST BP <80 MM HG: CPT | Performed by: STUDENT IN AN ORGANIZED HEALTH CARE EDUCATION/TRAINING PROGRAM

## 2024-01-25 PROCEDURE — 3075F SYST BP GE 130 - 139MM HG: CPT | Performed by: STUDENT IN AN ORGANIZED HEALTH CARE EDUCATION/TRAINING PROGRAM

## 2024-01-25 PROCEDURE — 99215 OFFICE O/P EST HI 40 MIN: CPT | Performed by: STUDENT IN AN ORGANIZED HEALTH CARE EDUCATION/TRAINING PROGRAM

## 2024-01-25 PROCEDURE — 3008F BODY MASS INDEX DOCD: CPT | Performed by: STUDENT IN AN ORGANIZED HEALTH CARE EDUCATION/TRAINING PROGRAM

## 2024-01-25 RX ORDER — HYDROCHLOROTHIAZIDE 12.5 MG/1
12.5 TABLET ORAL DAILY
Qty: 90 TABLET | Refills: 3 | Status: SHIPPED | OUTPATIENT
Start: 2024-01-25 | End: 2024-02-27 | Stop reason: SDUPTHER

## 2024-01-25 RX ORDER — ASPIRIN 81 MG/1
81 TABLET ORAL DAILY
Qty: 30 TABLET | Refills: 6 | Status: SHIPPED | OUTPATIENT
Start: 2024-01-25

## 2024-01-25 RX ORDER — BACLOFEN 10 MG/1
10 TABLET ORAL 3 TIMES DAILY
Qty: 90 TABLET | Refills: 6 | Status: SHIPPED | OUTPATIENT
Start: 2024-01-25

## 2024-01-25 RX ORDER — ACETAMINOPHEN 500 MG
5 TABLET ORAL NIGHTLY PRN
Qty: 30 TABLET | Refills: 6 | Status: SHIPPED | OUTPATIENT
Start: 2024-01-25

## 2024-01-25 RX ORDER — GABAPENTIN 800 MG/1
800 TABLET ORAL 3 TIMES DAILY
Qty: 90 TABLET | Refills: 6 | Status: SHIPPED | OUTPATIENT
Start: 2024-01-25 | End: 2024-05-13 | Stop reason: SDUPTHER

## 2024-01-25 RX ORDER — GABAPENTIN 300 MG/1
300 CAPSULE ORAL NIGHTLY
Qty: 30 CAPSULE | Refills: 6 | Status: SHIPPED | OUTPATIENT
Start: 2024-01-25 | End: 2024-04-15 | Stop reason: SDUPTHER

## 2024-01-25 RX ORDER — ATORVASTATIN CALCIUM 40 MG/1
40 TABLET, FILM COATED ORAL DAILY
Qty: 30 TABLET | Refills: 6 | Status: SHIPPED | OUTPATIENT
Start: 2024-01-25

## 2024-01-25 RX ORDER — LISINOPRIL 20 MG/1
20 TABLET ORAL DAILY
Qty: 30 TABLET | Refills: 6 | Status: SHIPPED | OUTPATIENT
Start: 2024-01-25 | End: 2024-05-13 | Stop reason: SDUPTHER

## 2024-01-25 RX ORDER — FLUTICASONE PROPIONATE 50 MCG
2 SPRAY, SUSPENSION (ML) NASAL DAILY
Qty: 16 G | Refills: 5 | Status: SHIPPED | OUTPATIENT
Start: 2024-01-25 | End: 2024-05-23 | Stop reason: SDUPTHER

## 2024-01-25 ASSESSMENT — PATIENT HEALTH QUESTIONNAIRE - PHQ9
1. LITTLE INTEREST OR PLEASURE IN DOING THINGS: NOT AT ALL
2. FEELING DOWN, DEPRESSED OR HOPELESS: NOT AT ALL
SUM OF ALL RESPONSES TO PHQ9 QUESTIONS 1 AND 2: 0

## 2024-01-25 ASSESSMENT — COLUMBIA-SUICIDE SEVERITY RATING SCALE - C-SSRS
6. HAVE YOU EVER DONE ANYTHING, STARTED TO DO ANYTHING, OR PREPARED TO DO ANYTHING TO END YOUR LIFE?: NO
1. IN THE PAST MONTH, HAVE YOU WISHED YOU WERE DEAD OR WISHED YOU COULD GO TO SLEEP AND NOT WAKE UP?: NO
2. HAVE YOU ACTUALLY HAD ANY THOUGHTS OF KILLING YOURSELF?: NO

## 2024-01-25 ASSESSMENT — ENCOUNTER SYMPTOMS
DEPRESSION: 0
LOSS OF SENSATION IN FEET: 0
OCCASIONAL FEELINGS OF UNSTEADINESS: 0

## 2024-01-25 ASSESSMENT — PAIN SCALES - GENERAL: PAINLEVEL: 0-NO PAIN

## 2024-01-25 NOTE — PATIENT INSTRUCTIONS
Ines Ms. Rhodes,    It was a pleasure seeing you again today! Today we talked about several important aspects of your health.    1) Please continue to decrease your smoking. I have ordered a low dose CT scan for you to obtain to screen for lung cancer.     2) Please obtain your PCV-20 vaccine at your next visit.    3) I am concerned that you might have sleep apnea, at your next visit we'll discuss scheduling you for a sleep study.     4) There are several important health screening tests that are ordered for you that you have not obtained. Please obtain: Mammogram ([phone]), cologuard, see women's health for cervical cancer screening ( [phone]).

## 2024-01-25 NOTE — PROGRESS NOTES
"HISTORY OF PRESENT ILLNESS:   Jud Rhodes is a 48 y.o. female with PMHx significant for L MCA CVA (c/b SAH with some residual aphasia), HTN, pre-diabetes, anxiety, and tobacco use disorder who presents to [facility] today for follow up. Pt was last seen in clinic on 10/09/2023 where we discussed her smoking at length and started chantix. Patient states that she is still smoking approximately 1 PPD. States that nicotine therpay and chantix did not help, but she is willing to try to quit smoking again. States that she has had a cough (\"smokers cough\") that his been bothering her. She was seen in the [facility] ED on 01/09 for this and was noted to be influenza positive. She states that her cough is somewhat better today but requests \"nasal spray\" for her chronic rhinitis, patient could not remember the name of what she had taken before, but believes that it may have been Flonase. Patient again expresses worry about parking and wants to expedite visit as much as possible to avoid paying parking fines.    Pt was last seen in clinic in October 2023. Patient states she tried the chantix for 2 weeks.       ROS: 12 point ROS negative unless as per HPI     Patient Active Problem List    Diagnosis Date Noted    Chronic rhinitis 10/06/2023    Chronic vasomotor rhinitis 10/06/2023    Class 2 severe obesity due to excess calories with serious comorbidity and body mass index (BMI) of 38.0 to 38.9 in adult (CMS/[facility]) 10/06/2023    History of chronic cough 10/06/2023    Neuropathy 10/06/2023    Yeast infection 10/06/2023    Amenorrhea 09/28/2023    Anxiety 09/28/2023    Aphasia due to late effects of cerebrovascular disease 09/28/2023    Bilateral low back pain 09/28/2023    Breast pain 09/28/2023    Cough 09/28/2023    Dyslipidemia 09/28/2023    Essential hypertension 09/28/2023    GERD (gastroesophageal reflux disease) 09/28/2023    Hemiparesis affecting dominant side as late effect of stroke (CMS/[facility]) 09/28/2023    " Hypercoagulopathy (CMS/[facility]) 2023    Loss of smell 2023    Loss of taste 2023    Meralgia paresthetica of left side 2023    Muscle spasm, nocturnal 2023    Opioid use disorder 2023    Pain in both knees 2023    Pain, neuropathic 2023    Panic attacks 2023    Prediabetes 2023    Shoulder pain, bilateral 2023    Skin rash 2023    Tobacco use disorder 2023    Acute ischemic left MCA stroke (CMS/[facility]) 2023    Encounter for screening colonoscopy 2023    Gait disturbance, post-stroke 2023    Stroke (CMS/[facility]) 10/27/2022    Opioid dependence on agonist therapy (CMS/[facility]) 2022    History of stroke with residual effects 10/23/2021    History of drug abuse in remission (CMS/[facility]) 10/23/2021    Hemiplegia and hemiparesis following cerebral infarction affecting unspecified side (CMS/[facility]) 2021    Aphasia following unspecified cerebrovascular disease 2021    Cerebral infarction due to unspecified occlusion or stenosis of left middle cerebral artery (CMS/[facility]) 2021    Hyperlipidemia, unspecified 2021    Cocaine use, unspecified with intoxication delirium (CMS/HCC) 2021    Nicotine dependence 2021    Alcohol dependence with withdrawal delirium (CMS/HCC) 2021    Opioid abuse (CMS/HCC) 2019    Cocaine abuse (CMS/[facility]) 2019    Cannabis abuse 2019    Chest pain 10/21/2017    Seasonal allergies 2016    Heroin use 2016    HTN (hypertension) 2014    Hypotension 2014    Drug abuse and dependence (CMS/[facility]) 2014    Chronic back pain 2014    Hyponatremia 2014    ODALYS (acute kidney injury) (CMS/[facility]) 2014    Anxiety and depression 2013    Withdrawal from opioids (CMS/[facility]) 2013    SAB (spontaneous ) 2012    Chorioamnionitis 2010    Neuralgia and neuritis, unspecified 2008    Other conduct disorders 10/26/2007     Drug abuse (CMS/[facility]) 10/26/2007    Asthma 04/13/2007    Lumbosacral spondylosis without myelopathy 01/30/2004    Essential hypertension, benign 01/13/2004    Lumbago 01/13/2004    Pain in joint, lower leg 01/13/2004        Current Outpatient Medications:     albuterol 90 mcg/actuation inhaler, Inhale 1-2 puffs every 6 hours if needed for shortness of breath. 4-6 hours as needed, Disp: 18 g, Rfl: 6    ammonium lactate (Amlactin) 12 % cream, Apply topically 2 times a day. to affected area, Disp: , Rfl:     aspirin 81 mg EC tablet, Take 1 tablet (81 mg) by mouth once daily., Disp: 30 tablet, Rfl: 6    atorvastatin (Lipitor) 40 mg tablet, Take 1 tablet (40 mg) by mouth once daily., Disp: 30 tablet, Rfl: 6    baclofen (Lioresal) 10 mg tablet, Take 1 tablet (10 mg) by mouth 3 times a day., Disp: 90 tablet, Rfl: 6    buprenorphine-naloxone (Suboxone) 8-2 mg SL film, , Disp: , Rfl:     buprenorphine-naloxone (Suboxone) 8-2 mg SL tablet, Place 1 tablet under the tongue twice a day., Disp: , Rfl:     busPIRone (Buspar) 10 mg tablet, Take 2 tablets (20 mg) by mouth 2 times a day., Disp: 60 tablet, Rfl: 6    carisoprodol (Soma) 350 mg tablet, Take 1 tablet (350 mg) by mouth 2 times a day., Disp: , Rfl:     cephalexin (Keflex) 500 mg capsule, Take 1 capsule (500 mg) by mouth 2 times a day., Disp: , Rfl:     folic acid (Folvite) 1 mg tablet, Take 1 tablet (1 mg) by mouth once daily., Disp: 30 tablet, Rfl: 6    gabapentin (Neurontin) 300 mg capsule, Take 1 capsule (300 mg) by mouth once daily at bedtime., Disp: 30 capsule, Rfl: 6    gabapentin (Neurontin) 800 mg tablet, Take 1 tablet (800 mg) by mouth 3 times a day., Disp: 90 tablet, Rfl: 6    hydroCHLOROthiazide (HYDRODiuril) 12.5 mg tablet, Take 1 tablet (12.5 mg) by mouth once daily., Disp: , Rfl:     ipratropium (Atrovent) 42 mcg (0.06 %) nasal spray, ADMINISTER TWO (2) SPRAYS INTO EACH NOSTRIL THREE (3) TIMES A DAY., Disp: 15 mL, Rfl: 1    ipratropium-albuteroL (Duo-Neb)  0.5-2.5 mg/3 mL nebulizer solution, Inhale 3 mL., Disp: , Rfl:     lidocaine (lidocaine HCL) 4 % cream, Apply topically., Disp: , Rfl:     lisinopril 20 mg tablet, Take 1 tablet (20 mg) by mouth once daily., Disp: 30 tablet, Rfl: 6    melatonin 5 mg tablet, Take 1 tablet (5 mg) by mouth as needed at bedtime for sleep., Disp: 30 tablet, Rfl: 6    multivitamin (Daily Multi-Vitamin) tablet, Take 1 tablet by mouth once daily., Disp: , Rfl:     multivitamin with minerals (Multiple Vitamin-Minerals) tablet, Take 1 tablet by mouth once daily., Disp: , Rfl:     naloxone (Narcan) 4 mg/0.1 mL nasal spray, Administer 1 spray (4 mg) into affected nostril(s)., Disp: , Rfl:     nicotine (Nicoderm CQ) 21 mg/24 hr patch, Place 1 patch on the skin once every 24 hours., Disp: , Rfl:     nicotine polacrilex (Nicorette) 2 mg gum, Take 1 each (2 mg) by mouth if needed for smoking cessation., Disp: , Rfl:     pantoprazole (ProtoNix) 40 mg EC tablet, Take 1 tablet (40 mg) by mouth once daily., Disp: 30 tablet, Rfl: 0    polyethylene glycol-electrolytes 420 gram solution, Take 4,000 mL by mouth., Disp: , Rfl:     QUEtiapine (SEROquel) 25 mg tablet, Take 0.5 tablets (12.5 mg) by mouth once daily., Disp: 30 tablet, Rfl: 6    sertraline (Zoloft) 100 mg tablet, Take 1 tablet (100 mg) by mouth once daily., Disp: 30 tablet, Rfl: 0    SSD 1 % cream, Apply topically once daily. to affected area, Disp: , Rfl:     varenicline (Chantix Starting Month Box) 0.5 mg (11)- 1 mg (42) tablet, Take 0.5mg tablet one time daily for 3 days, then increase to 0.5mg tablet twice daily for 4 days, then increase to 1mg tablet twice daily., Disp: , Rfl:     varenicline (Chantix) 1 mg tablet, Take 0.5 tablets (0.5 mg) by mouth once daily for 3 days, THEN 0.5 tablets (0.5 mg) 2 times a day for 4 days, THEN 1 tablet (1 mg) 2 times a day. Take with full glass of water.., Disp: 66 tablet, Rfl: 3    No results found for this or any previous visit (from the past 96  hour(s)).       PHYSICAL EXAM:   General: pt is pleasant, cooperative, in no acute distress  Heart: regular, nl s1, s2; no murmur, rub or Ed  Lungs: clear to auscultation bilaterally with good airflow throughout. No wheezes or rhonchi.  Abdomen: obese, soft, nontender, nondistended, no apparent mass  Extremities: no edema  Skin: no rash or lesions  Psychiatric: mental status and behavior appropriate for situation   Neurologic: Normal gait and stance. Normal speech character and tone. No apparent focal deficits.       Musculoskeletal: moving all extremities appropriately.    Assessment and plan:   Jud Rhodes is a 48 y.o. female with PMHx significant for L MCA CVA (c/b SAH with some residual aphasia), HTN, pre-diabetes, anxiety, and tobacco use disorder who presents to [facility] today for follow up.     Emphasis today:  -Discussed smoking cessation and she states that chantix/nicotine patches don't help. She stated that she will try to cut back to a half pack per day before her next appointment.  -Discussed all of her cancer screenings and their importance, reordered and given numbers to call and schedule.    Next Visit:  -Offer bupropion for smoking cessation.     #Tobacco Use Disorder  #Bronchospasm  ::Reports currently smoking 1PPD  -Stopped refilling chantix/nicotine patches as she states that she is not using them   -Patient will try to cut back to 1/2 PPD by next visit, and want to do this on her own.  -Low dose CT ordered  -Would offer buproprion at next visit    #Rhinitis  -Ordered flonase    #Meralgia Paresthetica, controlled  ::Patient was prescribed Soma previously by unknown physician, not refilled due to side effect profile and addictive potential   -Continue Gabapentin 800 mg TID  -Continue Gabapentin 300 mg at bedtime  -Continue baclofen 10 mg TID    #HTN  ::/78s,  -Continue lisinopril 20 mg daily  -Continue hydrochlorothiazide 12.5 mg daily    #HLD    -Continue atorvastatin      #Obesity  #Metabolic syndrome  :: A1C 6.3%. 2023  -Discuss starting metformin at next visit    #CVA, L MCA, stable  -c/w ASA 81 mg  -c/w atorvastatin 40 mg     #Panic Attacks  #Anxiety  -Sent referral for psychiatry at last visit, encouraged follow up  -Continue Buspirone 15 mg BID  -Continue Sertraline 100mg daily  -Patient reports self stopping quetiapine, will not re-order at this time       # Health Maintenance  Cancer Screening  - Colon Cancer: Patient unable to arrange person to transport her for colonoscopy. Ordered cologuard.   - Low dose Chest CT: Ordered today  - Mammogram: Re-ordered today   - PAP smear: Sent referral to gynecology today     - Last HbA1c: 6.3% 2023     Cardiovascular Screening   - ASCVD risk score: 5.5% 10-year risk, on statin  - STOPBAN, offered sleep study but does not want to pursue at this time    Infectious disease  - HIV: Negative   - Syphilis: Not tested, obtain upon drawing next labs  - Hepatitis C: Negative   Vaccines   - Influenza: Reports obtaining at pharmacy   - Shingles: Discuss at next visit  - Tdap: 2015, due   - Pneumonia: Unable to get today due to late appointment time, will give at next visit  - COVID: x3 vaccines, due for booster       RTC IN 3 months

## 2024-01-26 NOTE — PROGRESS NOTES
I reviewed the resident/fellow's documentation and discussed the patient with the resident/fellow. I agree with the resident/fellow's medical decision making as documented in the note.     Jovan Mohamud MD

## 2024-02-02 ENCOUNTER — TELEPHONE (OUTPATIENT)
Dept: PRIMARY CARE | Facility: HOSPITAL | Age: 49
End: 2024-02-02
Payer: COMMERCIAL

## 2024-02-02 DIAGNOSIS — J34.2 DEVIATED SEPTUM: ICD-10-CM

## 2024-02-06 RX ORDER — IPRATROPIUM BROMIDE 42 UG/1
SPRAY, METERED NASAL
Qty: 15 ML | Refills: 3 | Status: SHIPPED | OUTPATIENT
Start: 2024-02-06 | End: 2024-05-29 | Stop reason: SDUPTHER

## 2024-02-27 ENCOUNTER — HOSPITAL ENCOUNTER (EMERGENCY)
Facility: HOSPITAL | Age: 49
Discharge: HOME | End: 2024-02-27
Payer: COMMERCIAL

## 2024-02-27 VITALS
DIASTOLIC BLOOD PRESSURE: 91 MMHG | RESPIRATION RATE: 16 BRPM | SYSTOLIC BLOOD PRESSURE: 155 MMHG | TEMPERATURE: 98.4 F | OXYGEN SATURATION: 98 % | HEART RATE: 79 BPM

## 2024-02-27 DIAGNOSIS — I10 HYPERTENSION, UNSPECIFIED TYPE: ICD-10-CM

## 2024-02-27 DIAGNOSIS — K08.89 PAIN, DENTAL: Primary | ICD-10-CM

## 2024-02-27 DIAGNOSIS — K04.7 DENTAL INFECTION: ICD-10-CM

## 2024-02-27 DIAGNOSIS — I10 ESSENTIAL HYPERTENSION: ICD-10-CM

## 2024-02-27 PROCEDURE — 99283 EMERGENCY DEPT VISIT LOW MDM: CPT | Performed by: PHYSICIAN ASSISTANT

## 2024-02-27 PROCEDURE — 2500000001 HC RX 250 WO HCPCS SELF ADMINISTERED DRUGS (ALT 637 FOR MEDICARE OP): Mod: SE | Performed by: PHYSICIAN ASSISTANT

## 2024-02-27 PROCEDURE — 99284 EMERGENCY DEPT VISIT MOD MDM: CPT | Performed by: PHYSICIAN ASSISTANT

## 2024-02-27 RX ORDER — HYDROCHLOROTHIAZIDE 12.5 MG/1
12.5 TABLET ORAL DAILY
Qty: 30 TABLET | Refills: 0 | Status: SHIPPED | OUTPATIENT
Start: 2024-02-27 | End: 2024-12-11 | Stop reason: SDUPTHER

## 2024-02-27 RX ORDER — HYDROCHLOROTHIAZIDE 25 MG/1
12.5 TABLET ORAL DAILY
Status: DISCONTINUED | OUTPATIENT
Start: 2024-02-27 | End: 2024-02-27 | Stop reason: HOSPADM

## 2024-02-27 RX ORDER — AMOXICILLIN AND CLAVULANATE POTASSIUM 875; 125 MG/1; MG/1
1 TABLET, FILM COATED ORAL EVERY 12 HOURS
Qty: 14 TABLET | Refills: 0 | Status: SHIPPED | OUTPATIENT
Start: 2024-02-27 | End: 2024-03-05

## 2024-02-27 RX ORDER — AMOXICILLIN AND CLAVULANATE POTASSIUM 875; 125 MG/1; MG/1
1 TABLET, FILM COATED ORAL ONCE
Status: COMPLETED | OUTPATIENT
Start: 2024-02-27 | End: 2024-02-27

## 2024-02-27 RX ADMIN — HYDROCHLOROTHIAZIDE 12.5 MG: 25 TABLET ORAL at 17:55

## 2024-02-27 RX ADMIN — AMOXICILLIN AND CLAVULANATE POTASSIUM 1 TABLET: 875; 125 TABLET, FILM COATED ORAL at 17:55

## 2024-02-27 NOTE — ED PROVIDER NOTES
HPI:  48-year-old female with history of L MCA CVA with residual aphasia, HTN, pre-DM, anxiety, tobacco use presents complaining of left lower dental pain.  States she does have a scheduled follow-up appointment within the week for her dental pain however wanted to be seen sooner.  She denies any trouble breathing or trouble swallowing.  She checked her blood pressure at home and it was a little elevated therefore was concerned about this as well.  She states she has only been taking her lisinopril since her PCP visit last month.  She denies any headache, lightheadedness, dizziness, syncope, near syncope, chest pain, shortness of breath, nausea, vomiting.    Physical Exam:   GEN: Vitals noted. NAD  EYES:  EOMs grossly intact, anicteric sclera  ISREAL: Mucosa moist.  Poor dentition throughout, tender left lower anterior molar with mild gumline swelling without any signs of abscess, no trismus, normal voice, tolerate secretions well without difficulty  NECK: Supple.  CARD: RRR  PULMONARY: Moving air well. Clear all lung fields.  ABDOMEN: Soft, no guarding, no rigidity. Nontender. NABS  EXTREMITIES: Full ROM, no pitting edema,   SKIN: Intact, warm and dry  NEURO: Alert and oriented x 3, speech is clear, no obvious deficits noted.  Intact sensation strength all 4 extremities, nonantalgic gait      ----------------------------------------------------------------------------------------------------------------------------    MDM:  48-year-old female presenting for dental pain.  On exam she is well-appearing ambulating comfortably.  Vital signs with hypertension however currently symptomatic with it.  Does have some left lower dental tenderness with no signs of obvious abscess, no constitutional symptoms to indicate need for IV antibiotics or laboratory at this time.  Will start her on Augmentin for her signs of dental infection.  I did review EMR and it appears that she was post to continue her lisinopril and  hydrochlorothiazide, I communicated this to the patient and she was unaware she was post to consider the HCTZ.  I gave her dose of HCTZ and refilled 1 months worth of it and told her follow-up with her PCP and call them for any confusion in her medication management.  She is recommended to continue her plan scheduled follow-up with dentist.  Return precautions reviewed.    Diagnoses as of 02/27/24 1756   Pain, dental   Dental infection   Hypertension, unspecified type     No orders to display     Labs Reviewed - No data to display    ----------------------------------------------------------------------------------------------------------------------------    This note was dictated using a speech recognition program.  While an attempt was made at proof reading to minimize errors, minor errors in transcription may be present call for questions.     Shiva Booth PA-C  02/27/24 [id number]

## 2024-02-27 NOTE — DISCHARGE INSTRUCTIONS
Take the full course of antibiotics twice daily.  Your primary care doctor wanted you to take the lisinopril and the hydrochlorothiazide therefore continue the HCTZ daily.  Follow-up with your dentist, to schedule an appoint with our dental clinic call [phone]

## 2024-02-27 NOTE — ED TRIAGE NOTES
Pt says she has dental pain, supposed to have teeth pulled in March. Checks BP at home daily, hx CVA. Says BP at home yesterday 170s, called EMS, dropped her keys down elevator, EMS told her BP was probably elevated r/t stress/pain, did not get transported to hospital. Denies neuro symptoms/HA/CP/dizziness.

## 2024-05-13 ENCOUNTER — TELEPHONE (OUTPATIENT)
Dept: PRIMARY CARE | Facility: HOSPITAL | Age: 49
End: 2024-05-13
Payer: COMMERCIAL

## 2024-05-13 DIAGNOSIS — I63.312 CEREBROVASCULAR ACCIDENT (CVA) DUE TO THROMBOSIS OF LEFT MIDDLE CEREBRAL ARTERY (MULTI): ICD-10-CM

## 2024-05-13 DIAGNOSIS — I10 ESSENTIAL HYPERTENSION: ICD-10-CM

## 2024-05-13 DIAGNOSIS — M79.2 NEUROPATHIC PAIN: ICD-10-CM

## 2024-05-13 RX ORDER — GABAPENTIN 800 MG/1
800 TABLET ORAL 3 TIMES DAILY
Qty: 90 TABLET | Refills: 6 | Status: SHIPPED | OUTPATIENT
Start: 2024-05-13

## 2024-05-13 RX ORDER — GABAPENTIN 300 MG/1
300 CAPSULE ORAL NIGHTLY
Qty: 30 EACH | Refills: 6 | Status: SHIPPED | OUTPATIENT
Start: 2024-05-13

## 2024-05-13 RX ORDER — LISINOPRIL 20 MG/1
20 TABLET ORAL DAILY
Qty: 30 TABLET | Refills: 6 | Status: SHIPPED | OUTPATIENT
Start: 2024-05-13

## 2024-05-23 ENCOUNTER — TELEPHONE (OUTPATIENT)
Dept: PRIMARY CARE | Facility: HOSPITAL | Age: 49
End: 2024-05-23
Payer: COMMERCIAL

## 2024-05-23 DIAGNOSIS — R09.82 PND (POST-NASAL DRIP): ICD-10-CM

## 2024-05-23 DIAGNOSIS — R10.13 DYSPEPSIA: ICD-10-CM

## 2024-05-23 RX ORDER — PANTOPRAZOLE SODIUM 40 MG/1
40 TABLET, DELAYED RELEASE ORAL DAILY
Qty: 30 TABLET | Refills: 0 | Status: SHIPPED | OUTPATIENT
Start: 2024-05-23 | End: 2024-05-29 | Stop reason: SDUPTHER

## 2024-05-23 RX ORDER — FLUTICASONE PROPIONATE 50 MCG
2 SPRAY, SUSPENSION (ML) NASAL DAILY
Qty: 16 G | Refills: 5 | Status: SHIPPED | OUTPATIENT
Start: 2024-05-23 | End: 2024-05-29 | Stop reason: SDUPTHER

## 2024-05-29 DIAGNOSIS — J34.2 DEVIATED SEPTUM: ICD-10-CM

## 2024-05-29 DIAGNOSIS — R09.82 PND (POST-NASAL DRIP): ICD-10-CM

## 2024-05-29 DIAGNOSIS — R10.13 DYSPEPSIA: ICD-10-CM

## 2024-05-29 RX ORDER — IPRATROPIUM BROMIDE 42 UG/1
SPRAY, METERED NASAL
Qty: 15 ML | Refills: 3 | Status: SHIPPED | OUTPATIENT
Start: 2024-05-29

## 2024-05-29 RX ORDER — PANTOPRAZOLE SODIUM 40 MG/1
40 TABLET, DELAYED RELEASE ORAL DAILY
Qty: 30 TABLET | Refills: 0 | Status: SHIPPED | OUTPATIENT
Start: 2024-05-29

## 2024-05-29 RX ORDER — FLUTICASONE PROPIONATE 50 MCG
2 SPRAY, SUSPENSION (ML) NASAL DAILY
Qty: 16 G | Refills: 5 | Status: SHIPPED | OUTPATIENT
Start: 2024-05-29 | End: 2025-05-29

## 2024-05-29 NOTE — PROGRESS NOTES
Resent fluticasone, pantoprazole and Atrovent to [facility].

## 2024-05-29 NOTE — TELEPHONE ENCOUNTER
Patient called stating denise did not receive her prescriptons and She is also asking for Atrovent

## 2024-08-06 ENCOUNTER — LAB (OUTPATIENT)
Dept: LAB | Facility: LAB | Age: 49
End: 2024-08-06
Payer: COMMERCIAL

## 2024-08-12 DIAGNOSIS — R09.82 PND (POST-NASAL DRIP): ICD-10-CM

## 2024-08-12 DIAGNOSIS — R10.13 DYSPEPSIA: ICD-10-CM

## 2024-08-12 RX ORDER — FLUTICASONE PROPIONATE 50 MCG
2 SPRAY, SUSPENSION (ML) NASAL DAILY
Qty: 16 G | Refills: 5 | Status: SHIPPED | OUTPATIENT
Start: 2024-08-12 | End: 2025-08-12

## 2024-08-12 RX ORDER — PANTOPRAZOLE SODIUM 40 MG/1
TABLET, DELAYED RELEASE ORAL
Qty: 30 TABLET | Refills: 0 | Status: SHIPPED | OUTPATIENT
Start: 2024-08-12

## 2024-09-23 DIAGNOSIS — F41.9 ANXIETY: ICD-10-CM

## 2024-09-23 RX ORDER — SERTRALINE HYDROCHLORIDE 100 MG/1
TABLET, FILM COATED ORAL
Qty: 30 TABLET | Refills: 6 | Status: SHIPPED | OUTPATIENT
Start: 2024-09-23

## 2024-12-03 DIAGNOSIS — F17.200 TOBACCO USE DISORDER: ICD-10-CM

## 2024-12-03 DIAGNOSIS — R10.13 DYSPEPSIA: ICD-10-CM

## 2024-12-03 DIAGNOSIS — F41.9 ANXIETY: ICD-10-CM

## 2024-12-03 RX ORDER — SERTRALINE HYDROCHLORIDE 100 MG/1
100 TABLET, FILM COATED ORAL DAILY
Qty: 30 TABLET | Refills: 1 | Status: SHIPPED | OUTPATIENT
Start: 2024-12-03 | End: 2024-12-11 | Stop reason: SDUPTHER

## 2024-12-03 RX ORDER — PANTOPRAZOLE SODIUM 40 MG/1
40 TABLET, DELAYED RELEASE ORAL DAILY
Qty: 30 TABLET | Refills: 1 | Status: SHIPPED | OUTPATIENT
Start: 2024-12-03

## 2024-12-03 RX ORDER — FOLIC ACID 1 MG/1
1 TABLET ORAL DAILY
Qty: 30 TABLET | Refills: 1 | Status: SHIPPED | OUTPATIENT
Start: 2024-12-03

## 2024-12-06 ENCOUNTER — TELEPHONE (OUTPATIENT)
Dept: PRIMARY CARE | Facility: HOSPITAL | Age: 49
End: 2024-12-06
Payer: COMMERCIAL

## 2024-12-06 DIAGNOSIS — I10 ESSENTIAL HYPERTENSION: ICD-10-CM

## 2024-12-06 RX ORDER — LISINOPRIL 20 MG/1
TABLET ORAL
Qty: 30 TABLET | Refills: 0 | Status: SHIPPED | OUTPATIENT
Start: 2024-12-06 | End: 2024-12-11 | Stop reason: SDUPTHER

## 2024-12-06 NOTE — TELEPHONE ENCOUNTER
Patient is asking for refill on gabapentin 300 mg and 800 mg. Until she comes to her appointment 12/12 and 2:45 pm

## 2024-12-09 ENCOUNTER — PATIENT MESSAGE (OUTPATIENT)
Dept: PRIMARY CARE | Facility: HOSPITAL | Age: 49
End: 2024-12-09
Payer: COMMERCIAL

## 2024-12-09 NOTE — TELEPHONE ENCOUNTER
Patient called the office yelling screaming and cursing regarding her prescriptions. Patient was advised to schedule an appointment to get refills on medications. I disconnected the call due to patient yelling and screaming obscenities in the phone.

## 2024-12-09 NOTE — PATIENT COMMUNICATION
Patient would like a call back from a provider, she is upset because she was told that one of the other [facility] providers said she had to schedule a visit before further refills would be given.

## 2024-12-10 ENCOUNTER — TELEPHONE (OUTPATIENT)
Dept: PRIMARY CARE | Facility: HOSPITAL | Age: 49
End: 2024-12-10
Payer: COMMERCIAL

## 2024-12-11 ENCOUNTER — OFFICE VISIT (OUTPATIENT)
Dept: PRIMARY CARE | Facility: HOSPITAL | Age: 49
End: 2024-12-11
Payer: COMMERCIAL

## 2024-12-11 VITALS
BODY MASS INDEX: 31.36 KG/M2 | HEART RATE: 109 BPM | HEIGHT: 67 IN | SYSTOLIC BLOOD PRESSURE: 141 MMHG | OXYGEN SATURATION: 99 % | DIASTOLIC BLOOD PRESSURE: 86 MMHG | TEMPERATURE: 97 F | WEIGHT: 199.8 LBS

## 2024-12-11 DIAGNOSIS — E78.5 DYSLIPIDEMIA: ICD-10-CM

## 2024-12-11 DIAGNOSIS — M54.50 ACUTE MIDLINE LOW BACK PAIN WITHOUT SCIATICA: ICD-10-CM

## 2024-12-11 DIAGNOSIS — M79.2 NEUROPATHIC PAIN: ICD-10-CM

## 2024-12-11 DIAGNOSIS — R73.03 PREDIABETES: Primary | ICD-10-CM

## 2024-12-11 DIAGNOSIS — I10 ESSENTIAL HYPERTENSION: ICD-10-CM

## 2024-12-11 DIAGNOSIS — F41.9 ANXIETY: ICD-10-CM

## 2024-12-11 DIAGNOSIS — I63.312 CEREBROVASCULAR ACCIDENT (CVA) DUE TO THROMBOSIS OF LEFT MIDDLE CEREBRAL ARTERY (MULTI): ICD-10-CM

## 2024-12-11 PROCEDURE — 3077F SYST BP >= 140 MM HG: CPT

## 2024-12-11 PROCEDURE — 99213 OFFICE O/P EST LOW 20 MIN: CPT | Mod: GC

## 2024-12-11 PROCEDURE — 3079F DIAST BP 80-89 MM HG: CPT

## 2024-12-11 PROCEDURE — 3008F BODY MASS INDEX DOCD: CPT

## 2024-12-11 PROCEDURE — 99213 OFFICE O/P EST LOW 20 MIN: CPT

## 2024-12-11 RX ORDER — LISINOPRIL 20 MG/1
20 TABLET ORAL DAILY
Qty: 30 TABLET | Refills: 0 | Status: SHIPPED | OUTPATIENT
Start: 2024-12-11 | End: 2025-01-10

## 2024-12-11 RX ORDER — HYDROCHLOROTHIAZIDE 12.5 MG/1
12.5 TABLET ORAL DAILY
Qty: 30 TABLET | Refills: 0 | Status: SHIPPED | OUTPATIENT
Start: 2024-12-11 | End: 2025-01-10

## 2024-12-11 RX ORDER — BUSPIRONE HYDROCHLORIDE 10 MG/1
20 TABLET ORAL 2 TIMES DAILY
Qty: 120 TABLET | Refills: 0 | Status: SHIPPED | OUTPATIENT
Start: 2024-12-11 | End: 2025-01-10

## 2024-12-11 RX ORDER — GABAPENTIN 300 MG/1
300 CAPSULE ORAL NIGHTLY
Qty: 30 CAPSULE | Refills: 0 | Status: SHIPPED | OUTPATIENT
Start: 2024-12-11 | End: 2025-01-10

## 2024-12-11 RX ORDER — SERTRALINE HYDROCHLORIDE 100 MG/1
100 TABLET, FILM COATED ORAL DAILY
Qty: 30 TABLET | Refills: 0 | Status: SHIPPED | OUTPATIENT
Start: 2024-12-11 | End: 2025-01-10

## 2024-12-11 RX ORDER — GABAPENTIN 800 MG/1
800 TABLET ORAL 3 TIMES DAILY
Qty: 90 TABLET | Refills: 0 | Status: SHIPPED | OUTPATIENT
Start: 2024-12-11 | End: 2025-01-10

## 2024-12-11 RX ORDER — ASPIRIN 81 MG/1
81 TABLET ORAL DAILY
Qty: 30 TABLET | Refills: 0 | Status: SHIPPED | OUTPATIENT
Start: 2024-12-11 | End: 2025-01-10

## 2024-12-11 RX ORDER — ATORVASTATIN CALCIUM 40 MG/1
40 TABLET, FILM COATED ORAL DAILY
Qty: 30 TABLET | Refills: 0 | Status: SHIPPED | OUTPATIENT
Start: 2024-12-11 | End: 2025-01-10

## 2024-12-11 RX ORDER — METHOCARBAMOL 500 MG/1
500 TABLET, FILM COATED ORAL 2 TIMES DAILY
Qty: 60 TABLET | Refills: 0 | Status: SHIPPED | OUTPATIENT
Start: 2024-12-11 | End: 2025-01-10

## 2024-12-11 ASSESSMENT — ENCOUNTER SYMPTOMS
DEPRESSION: 0
LOSS OF SENSATION IN FEET: 0
OCCASIONAL FEELINGS OF UNSTEADINESS: 1

## 2024-12-11 ASSESSMENT — PAIN SCALES - GENERAL: PAINLEVEL_OUTOF10: 9

## 2024-12-11 NOTE — PROGRESS NOTES
[facility]    SUBJECTIVE:  CC: Follow-up visit and medication refill     HPI: Jud Rhodes is a 49 y.o. female with a PMHx significant for L MCA CVA (c/b SAH with some residual aphasia), HTN, HLD, pre-diabetes, anxiety, and tobacco use disorder who presents to [facility] today for follow up.    Interval History/History per chart review:  Patient was last seen at [facility] on 01/25/2024 at which time smoking cessation and cancer screening was discussed. The patient was ordered a Cologuard, referred to OB/GYN for pap smear update, and ordered a mammogram. Based on chart review, none of these have been completed at this time.    Per the patient:        Health maintenance:  Health Maintenance   Topic Date Due    Yearly Adult Physical  Never done    Colorectal Cancer Screening  Never done    MMR Vaccines (1 of 1 - Standard series) Never done    Pneumococcal Vaccine: Pediatrics (0 to 5 Years) and At-Risk Patients (6 to 64 Years) (1 of 2 - PCV) Never done    Cervical Cancer Screening  Never done    Hepatitis B Vaccines (2 of 3 - Hep B Twinrix 3-dose series) 04/09/2009    Hepatitis A Vaccines (2 of 2 - Risk 2-dose series) 09/12/2009    Mammogram  07/26/2023    Zoster Vaccines (1 of 2) Never done    Diabetes: Hemoglobin A1C  03/28/2024    Diabetes Screening  03/28/2024    Influenza Vaccine (1) 09/01/2024    COVID-19 Vaccine (3 - 2024-25 season) 09/01/2024    DTaP/Tdap/Td Vaccines (2 - Td or Tdap) 05/19/2025    Lipid Panel  03/28/2028    HIV Screening  Completed    Hepatitis C Screening  Completed    HIB Vaccines  Aged Out    IPV Vaccines  Aged Out    Meningococcal Vaccine  Aged Out    Rotavirus Vaccines  Aged Out    HPV Vaccines  Aged Out       Medications:    Current Outpatient Medications:     albuterol 90 mcg/actuation inhaler, Inhale 1-2 puffs every 6 hours if needed for shortness of breath. 4-6 hours as needed, Disp: 18 g, Rfl: 6    ammonium lactate (Amlactin) 12 % cream, Apply topically 2  times a day. to affected area, Disp: , Rfl:     aspirin 81 mg EC tablet, Take 1 tablet (81 mg) by mouth once daily., Disp: 30 tablet, Rfl: 6    atorvastatin (Lipitor) 40 mg tablet, TAKE ONE (1) TABLET (40 MG) BY MOUTH ONCE DAILY., Disp: 30 tablet, Rfl: 6    baclofen (Lioresal) 10 mg tablet, TAKE ONE (1) TABLET (10 MG) BY MOUTH THREE (3) TIMES A DAY., Disp: 90 tablet, Rfl: 2    buprenorphine-naloxone (Suboxone) 8-2 mg SL film, , Disp: , Rfl:     buprenorphine-naloxone (Suboxone) 8-2 mg SL tablet, Place 1 tablet under the tongue twice a day., Disp: , Rfl:     busPIRone (Buspar) 10 mg tablet, Take 2 tablets (20 mg) by mouth 2 times a day., Disp: 60 tablet, Rfl: 6    fluticasone (Flonase) 50 mcg/actuation nasal spray, Administer 2 sprays into each nostril once daily. Shake gently. Before first use, prime pump. After use, clean tip and replace cap., Disp: 16 g, Rfl: 5    folic acid (Folvite) 1 mg tablet, Take 1 tablet (1 mg) by mouth once daily., Disp: 30 tablet, Rfl: 1    gabapentin (Neurontin) 300 mg capsule, Take 1 capsule (300 mg) by mouth once daily at bedtime., Disp: 30 each, Rfl: 6    gabapentin (Neurontin) 800 mg tablet, Take 1 tablet (800 mg) by mouth 3 times a day., Disp: 90 tablet, Rfl: 6    hydroCHLOROthiazide (Microzide) 12.5 mg tablet, Take 1 tablet (12.5 mg) by mouth once daily., Disp: 30 tablet, Rfl: 0    ipratropium (Atrovent) 42 mcg (0.06 %) nasal spray, ADMINISTER TWO (2) SPRAYS INTO EACH NOSTRIL THREE (3) TIMES A DAY., Disp: 15 mL, Rfl: 2    ipratropium-albuteroL (Duo-Neb) 0.5-2.5 mg/3 mL nebulizer solution, Inhale 3 mL., Disp: , Rfl:     lidocaine (lidocaine HCL) 4 % cream, Apply topically., Disp: , Rfl:     lisinopril 20 mg tablet, TAKE ONE (1) TABLET (20 MG) BY MOUTH ONCE DAILY., Disp: 30 tablet, Rfl: 0    melatonin 5 mg tablet, Take 1 tablet (5 mg) by mouth as needed at bedtime for sleep., Disp: 30 tablet, Rfl: 6    multivitamin tablet, TAKE ONE (1) TABLET DAILY., Disp: 90 tablet, Rfl: 2     multivitamin with minerals (Multiple Vitamin-Minerals) tablet, Take 1 tablet by mouth once daily., Disp: , Rfl:     naloxone (Narcan) 4 mg/0.1 mL nasal spray, Administer 1 spray (4 mg) into affected nostril(s)., Disp: , Rfl:     nicotine (Nicoderm CQ) 21 mg/24 hr patch, Place 1 patch on the skin once every 24 hours., Disp: , Rfl:     nicotine polacrilex (Nicorette) 2 mg gum, Take 1 each (2 mg) by mouth if needed for smoking cessation., Disp: , Rfl:     pantoprazole (ProtoNix) 40 mg EC tablet, Take 1 tablet (40 mg) by mouth once daily. Do not crush, chew, or split., Disp: 30 tablet, Rfl: 1    polyethylene glycol-electrolytes 420 gram solution, Take 4,000 mL by mouth., Disp: , Rfl:     sertraline (Zoloft) 100 mg tablet, Take 1 tablet (100 mg) by mouth once daily., Disp: 30 tablet, Rfl: 1    SSD 1 % cream, Apply topically once daily. to affected area, Disp: , Rfl:     Require med refills?   Preferred pharmacy:       Past medical history:  Past Medical History:   Diagnosis Date    Alcohol use, unspecified with withdrawal delirium (Multi) 01/14/2021    Alcohol withdrawal syndrome, with delirium    Cerebral infarction due to unspecified occlusion or stenosis of left middle cerebral artery (Multi) 01/14/2021    Cerebral infarction due to occlusion or stenosis of left middle cerebral artery    Cocaine use, unspecified with intoxication delirium (Multi) 01/14/2021    Cocaine intoxication delirium    Other specified symptoms and signs involving the circulatory and respiratory systems 11/22/2021    Choking episode occurring both during daytime and at night       Allergies:  No Known Allergies    Surgical history:  Past Surgical History:   Procedure Laterality Date    MR CHEST ANGIO WO IV CONTRAST  10/11/2020    MR CHEST ANGIO WO IV CONTRAST 10/11/2020 [facility] CLINICAL LEGACY    OTHER SURGICAL HISTORY  01/15/2021    Knee surgery    OTHER SURGICAL HISTORY  01/15/2021    Wrist surgery    OTHER SURGICAL HISTORY  01/14/2021     Thrombectomy       Family history:  Family History   Problem Relation Name Age of Onset    Hypertension Mother         Social history:   reports that she has been smoking cigarettes. She has never used smokeless tobacco. She reports that she does not drink alcohol and does not use drugs.    Social History     Social History Narrative    Not on file       1. Living situation:   2. Work:   3. Alcohol:   4. Tobacco:  5. Other drugs:   6. Diet/exercise:     Safety:  - Housing insecurity  - Food insecurity  - Weapons in the home:     Review of systems:  As per HPI    OBJECTIVE:  Vitals:  There were no vitals filed for this visit.    Physical exam:  Constitutional: Well-developed female in no acute distress.  HEENT: NC/AT, sclera anicteric  Respiratory: CTAB. No wheezes, rales, or rhonchi. Normal respiratory effort.  Cardiovascular: RRR. No murmurs, gallops, or rubs.  Abdominal: Soft, nondistended, nontender to palpation. Bowel sounds present. No hepatosplenomegaly or masses.   Neuro: AAOx3. CN II-XII grossly intact. Tongue midline, no facial droop  MSK: No LE edema bilaterally. Moving extremities well  Skin: Warm, dry. No rashes or wounds.  Psych: Appropriate mood and affect.    Labs:  No results found for this or any previous visit (from the past 24 hours).    Imaging:  No results found.    ASSESSMENT and PLAN:  Jud Rhodes is a 49 y.o. female with a PMHx significant for L MCA CVA (c/b SAH with some residual aphasia), HTN, HLD, pre-diabetes, anxiety, and tobacco use disorder who presents to [facility] today for follow up.      #HTN  ::/78s,  -Continue lisinopril 20 mg daily  -Continue hydrochlorothiazide 12.5 mg daily    #Tobacco Use Disorder  #Bronchospasm  ::Reports currently smoking 1PPD  -Stopped refilling chantix/nicotine patches as she states that she is not using them   -Patient will try to cut back to 1/2 PPD by next visit, and want to do this on her own.  ****  -Low dose CT ordered  -Would offer  buproprion at next visit      #Meralgia Paresthetica, controlled  ::Patient was prescribed Soma previously by unknown physician, not refilled due to side effect profile and addictive potential   -Continue Gabapentin 800 mg TID  -Continue Gabapentin 300 mg at bedtime  -Continue baclofen 10 mg TID    #HLD  -lipid panel 03/2023   -total 163, , HDL 44  Plan:  -Repeat lipid panel ***  -Continue atorvastatin      #Obesity  #Metabolic syndrome  #Pre-diabetes   :: A1C 6.3%. 03/2023  -Discuss starting metformin at next visit  Plan:  -Repeat A1c in office today ***     #CVA, L MCA, stable  -c/w ASA 81 mg  -c/w atorvastatin 40 mg      #Panic Attacks  #Anxiety  -Sent referral for psychiatry previously, encouraged follow up  -Patient reports self stopping quetiapine, will not re-order at this time  Plan:  -Continue Buspirone 15 mg BID  -Continue Sertraline 100mg daily ***    #Health maintenance  - Last HbA1c: ***  - ASCVD: ****  - STOP-BANG: Not addressed this visit  - Infectious:  - Hep C Negative 2023   - HIV  Negative 2023   - Vaccinations:  - Tdap 2015   - Flu ***  - VZV ***  - PNA ***  - Colonoscopy: Cologuard ordered Jan 2024, ***  - Pap testing ***, mammogram ***  - Low-dose CT chest:  - Bone density: N/a  - AAA screening: N/a  Plan:  -    Follow-up in ***.    Patient and plan discussed with attending physician {[facility] attendings:[id number]}.    Bev Gomez MD  Internal Medicine, PGY-1   [facility]    No

## 2024-12-11 NOTE — PROGRESS NOTES
"  [facility]    HPI: Jud Rhodes is a 49 y.o. 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      Interval History/History per chart review:  Patient was last seen at [facility] on 01/25/2024 at which time smoking cessation and cancer screening was discussed. The patient was ordered a Cologuard, referred to OB/GYN for pap smear update, and ordered a mammogram. Based on chart review, none of these have been completed at this time.     Per the patient:  The patient states that her main concern today is medication refills, as she is \"not able to get a refill unless she was seen in office\". The patient expressed that she has limited time to be in office today and is in a \"rush\", but is agreeable to returning to clinic within the next month for a more in-dept discussion and review.    During our visit, the patient states she needs her blood pressure, anxiety meds, and gabapentin refilled.    She also endorses a recent fall at home. She states that there was some water on the floor of her home, which caused her to slip and fall forward. Since the fall she has experienced pain in her lower back, which is largely unchanged, as well as pain in both of her knees, which has resolved since the fall. She reports that she was seen in the ED following the fall, at which time she was given \"muscle relaxers and tylenol\", which helped.     Upon chart review, she was seen on 11/25 in the ED. During her encounter she did under x-rays of the lumbar spine and bilateral knees, revealing: \"lumbar spine, x-ray was obtained which showed no acute osseous abnormality but did note degenerative changes. XR of bilateral knee shows no acute osseous abnormality.\" She was sent home with naproxen 500 mg BID prn, acetaminophen 325 Q4 hrs prn, methocarbamol 500 mg Q6 hrs prn, " "and topical lidocaine as well as instructions to ice her back and knees.    Pt states that the lidocaine was not approved by her insurance so she has not used that. She does endorse taking \"four tylenol 500 mg at once, up to three times a day\", which has not helped. However, she believes the methocarbamol has been helping better than her typical baclofen TID that was previously prescribed to her. She states that she has tried ice and heat, both of which has been unhelpful in alleviating her pain. She states that her \" is sending her to physical therapy\", but she is unsure when she is supposed to start this.  She is wondering if she could get more \"muscle relaxers\" prescribed today.    Health maintenance:  Health Maintenance   Topic Date Due    Yearly Adult Physical  Never done    Colorectal Cancer Screening  Never done    MMR Vaccines (1 of 1 - Standard series) Never done    Pneumococcal Vaccine: Pediatrics (0 to 5 Years) and At-Risk Patients (6 to 64 Years) (1 of 2 - PCV) Never done    Cervical Cancer Screening  Never done    Hepatitis B Vaccines (2 of 3 - Hep B Twinrix 3-dose series) 04/09/2009    Hepatitis A Vaccines (2 of 2 - Risk 2-dose series) 09/12/2009    Mammogram  07/26/2023    Zoster Vaccines (1 of 2) Never done    Diabetes: Hemoglobin A1C  03/28/2024    Diabetes Screening  03/28/2024    Influenza Vaccine (1) 09/01/2024    COVID-19 Vaccine (3 - 2024-25 season) 09/01/2024    DTaP/Tdap/Td Vaccines (2 - Td or Tdap) 05/19/2025    Lipid Panel  03/28/2028    HIV Screening  Completed    Hepatitis C Screening  Completed    HIB Vaccines  Aged Out    IPV Vaccines  Aged Out    Meningococcal Vaccine  Aged Out    Rotavirus Vaccines  Aged Out    HPV Vaccines  Aged Out       Medications:    Current Outpatient Medications:     albuterol 90 mcg/actuation inhaler, Inhale 1-2 puffs every 6 hours if needed for shortness of breath. 4-6 hours as needed, Disp: 18 g, Rfl: 6    ammonium lactate (Amlactin) 12 % cream, Apply " topically 2 times a day. to affected area, Disp: , Rfl:     aspirin 81 mg EC tablet, Take 1 tablet (81 mg) by mouth once daily., Disp: 30 tablet, Rfl: 6    atorvastatin (Lipitor) 40 mg tablet, TAKE ONE (1) TABLET (40 MG) BY MOUTH ONCE DAILY., Disp: 30 tablet, Rfl: 6    baclofen (Lioresal) 10 mg tablet, TAKE ONE (1) TABLET (10 MG) BY MOUTH THREE (3) TIMES A DAY., Disp: 90 tablet, Rfl: 2    buprenorphine-naloxone (Suboxone) 8-2 mg SL film, , Disp: , Rfl:     buprenorphine-naloxone (Suboxone) 8-2 mg SL tablet, Place 1 tablet under the tongue twice a day., Disp: , Rfl:     busPIRone (Buspar) 10 mg tablet, Take 2 tablets (20 mg) by mouth 2 times a day., Disp: 60 tablet, Rfl: 6    fluticasone (Flonase) 50 mcg/actuation nasal spray, Administer 2 sprays into each nostril once daily. Shake gently. Before first use, prime pump. After use, clean tip and replace cap., Disp: 16 g, Rfl: 5    folic acid (Folvite) 1 mg tablet, Take 1 tablet (1 mg) by mouth once daily., Disp: 30 tablet, Rfl: 1    gabapentin (Neurontin) 300 mg capsule, Take 1 capsule (300 mg) by mouth once daily at bedtime., Disp: 30 each, Rfl: 6    gabapentin (Neurontin) 800 mg tablet, Take 1 tablet (800 mg) by mouth 3 times a day., Disp: 90 tablet, Rfl: 6    hydroCHLOROthiazide (Microzide) 12.5 mg tablet, Take 1 tablet (12.5 mg) by mouth once daily., Disp: 30 tablet, Rfl: 0    ipratropium (Atrovent) 42 mcg (0.06 %) nasal spray, ADMINISTER TWO (2) SPRAYS INTO EACH NOSTRIL THREE (3) TIMES A DAY., Disp: 15 mL, Rfl: 2    ipratropium-albuteroL (Duo-Neb) 0.5-2.5 mg/3 mL nebulizer solution, Inhale 3 mL., Disp: , Rfl:     lidocaine (lidocaine HCL) 4 % cream, Apply topically., Disp: , Rfl:     lisinopril 20 mg tablet, TAKE ONE (1) TABLET (20 MG) BY MOUTH ONCE DAILY., Disp: 30 tablet, Rfl: 0    melatonin 5 mg tablet, Take 1 tablet (5 mg) by mouth as needed at bedtime for sleep., Disp: 30 tablet, Rfl: 6    multivitamin tablet, TAKE ONE (1) TABLET DAILY., Disp: 90 tablet, Rfl:  2    multivitamin with minerals (Multiple Vitamin-Minerals) tablet, Take 1 tablet by mouth once daily., Disp: , Rfl:     naloxone (Narcan) 4 mg/0.1 mL nasal spray, Administer 1 spray (4 mg) into affected nostril(s)., Disp: , Rfl:     nicotine (Nicoderm CQ) 21 mg/24 hr patch, Place 1 patch on the skin once every 24 hours., Disp: , Rfl:     nicotine polacrilex (Nicorette) 2 mg gum, Take 1 each (2 mg) by mouth if needed for smoking cessation., Disp: , Rfl:     pantoprazole (ProtoNix) 40 mg EC tablet, Take 1 tablet (40 mg) by mouth once daily. Do not crush, chew, or split., Disp: 30 tablet, Rfl: 1    polyethylene glycol-electrolytes 420 gram solution, Take 4,000 mL by mouth., Disp: , Rfl:     sertraline (Zoloft) 100 mg tablet, Take 1 tablet (100 mg) by mouth once daily., Disp: 30 tablet, Rfl: 1    SSD 1 % cream, Apply topically once daily. to affected area, Disp: , Rfl:     Require med refills? Yes  Preferred pharmacy: [email or web address] on [address]       Past medical history:  Past Medical History:   Diagnosis Date    Alcohol use, unspecified with withdrawal delirium (Multi) 01/14/2021    Alcohol withdrawal syndrome, with delirium    Cerebral infarction due to unspecified occlusion or stenosis of left middle cerebral artery (Multi) 01/14/2021    Cerebral infarction due to occlusion or stenosis of left middle cerebral artery    Cocaine use, unspecified with intoxication delirium (Multi) 01/14/2021    Cocaine intoxication delirium    Other specified symptoms and signs involving the circulatory and respiratory systems 11/22/2021    Choking episode occurring both during daytime and at night         Allergies:  No Known Allergies    Surgical history:  Past Surgical History:   Procedure Laterality Date    MR CHEST ANGIO WO IV CONTRAST  10/11/2020    MR CHEST ANGIO WO IV CONTRAST 10/11/2020 [facility] CLINICAL LEGACY    OTHER SURGICAL HISTORY  01/15/2021    Knee surgery    OTHER SURGICAL HISTORY  01/15/2021    Wrist surgery    OTHER  SURGICAL HISTORY  01/14/2021    Thrombectomy       Family history:  Family History   Problem Relation Name Age of Onset    Hypertension Mother         Social history:   reports that she has been smoking cigarettes. She has never used smokeless tobacco. She reports that she does not drink alcohol and does not use drugs.    Social History     Social History Narrative    Not on file       1. Living situation: unable to discuss today  2. Work:   3. Alcohol:   4. Tobacco:  5. Other drugs:   6. Diet/exercise:     Safety:  - Housing insecurity  - Food insecurity  - Weapons in the home:     Review of systems:  As per HPI      OBJECTIVE:  Vitals:  Vitals:    12/11/24 1447   BP: 141/86   Pulse: 109   Temp: 36.1 °C (97 °F)   SpO2: 99%       Physical exam:  Constitutional: Well-developed female in no acute distress.  HEENT: NC/AT, sclera anicteric  Respiratory: CTAB. No wheezes, rales, or rhonchi. Normal respiratory effort.  Cardiovascular: RRR. No murmurs, gallops, or rubs.  Abdominal: Soft, nondistended, nontender to palpation. No overt hepatosplenomegaly or masses.   Neuro: AAOx3. CN II-XII grossly intact. Tongue midline, no facial droop  MSK: No LE edema bilaterally. 5/5 lower extremity strength bilaterally. Gait appears antalgic.  Skin: Warm, dry. No rashes or wounds.  Psych: Appropriate mood and affect.    Labs:  No results found for this or any previous visit (from the past 24 hours).    Imaging:  XR lumbar spine 2-3 views    Result Date: 11/25/2024  * * *Final Report* * * DATE OF EXAM: Nov 25 2024  4:58PM   TWAN   [id number]  -  XR LUMBAR 3V AP/LAT/L5-S1  / ACCESSION #  [id number] PROCEDURE REASON: Back pain      * * * * Physician Interpretation * * * *  EXAMINATION:  XR LUMBAR 3V AP/LAT/L5-S1 PATIENT/TECHNOLOGIST PROVIDED HISTORY: trauma CLINICAL INFORMATION:  49 years old Female with Back pain TECHNIQUE: XR LUMBAR 3V AP/LAT/L5-S1    Laterality:  NOT APPLICABLE    Number of different views (projections): 3 COMPARISON: None  RESULT: Lumbar spine: Counting reference:  Lumbosacral junction.  For the purposes of this report, L4-5 is considered the level of the iliac crest and there are 5 lumbar-type vertebrae. Anatomic Variant: Transitional L5 vertebral body. Post-op assessment:  N/A Alignment:  Alignment is satisfactory. Vertebral bodies:  Vertebral body heights are maintained. Spine articulations:  Mild-moderate disc space narrowing L5-S1. Remainder of the disc spaces appear maintained Other: Sacroiliac joints are symmetric and maintained.    IMPRESSION: No radiographic evidence of acute osseous abnormality in the lumbar spine. Degenerative changes as described. : TYLER   Transcribe Date/Time: Nov 25 2024  5:03P Dictated by : RAHUL FERNÁNDEZ DO This examination was interpreted and the report reviewed and electronically signed by: RAHUL FERNÁNDEZ DO on Nov 25 2024  5:18PM  EST    XR KNEE INJURY 4V AP/LAT/OBLS BILATERAL    Result Date: 11/25/2024  * * *Final Report* * * DATE OF EXAM: Nov 25 2024  4:58PM   LUX   [id number]  -  XR KNEE 4V AP/LAT/OBLS  JIM  / ACCESSION #  [id number] PROCEDURE REASON: Trauma      * * * * Physician Interpretation * * * *  EXAMINATION:  XR KNEE 4V AP/LAT/OBLS  JIM PATIENT/TECHNOLOGIST PROVIDED HISTORY: trauma CLINICAL INFORMATION:  49 years old Female with Trauma TECHNIQUE: XR KNEE 4V AP/LAT/OBLS  JIM    Laterality:  BILATERAL    Number of different views (projections): 4 views of each knee COMPARISON: Right knee radiographs 3/8/2009 RESULT: No acute fracture. Corticated ossicle adjacent to the RIGHT fibular head which may be related to remote injury. No joint effusion in either knee. Joint spaces are maintained on these nonweightbearing views. Bilateral extensor mechanism enthesophytes.    IMPRESSION: No acute osseous abnormality. : [facility]   Transcribe Date/Time: Nov 25 2024  4:59P Dictated by : RAHUL FERNÁNDEZ DO This examination was interpreted and the report reviewed and electronically  signed by: RAHUL FERNÁNDEZ DO on Nov 25 2024  5:02PM  EST      ASSESSMENT and PLAN:  Jud Rhodes is a 49 y.o. female with a PMHx significant for L MCA CVA (c/b SAH with some residual aphasia), HTN, HLD, lower back pain, pre-diabetes, anxiety, and tobacco use disorder who presents to [facility] today for medical refill.    Discussion today:  I did discuss with the patient that her labs are very overdue (A1c, CBC, CMP/RFP, lipid panel) and there are various health maintenance topics that need to be discussed in office (pap smear, colonoscopy, mammogram, smoking cessation/LDCT). Because the patient repeatedly expressed she is limited on time today, I discussed with her that I will refill her medications today (sertraline, buspirone, lisinopril, HCTZ, atorvastatin, aspirin, and gabapentin as well as a new Rx for methocarbamol in place of baclofen), a for a single 30-day-supply, no refills, and she must come back to the office within the next month (Jan 2025) for a follow-up appointment to discuss these much over-due items.   Labs were ordered (CBC, CMP, lipid panel, A1c) and I advised the patient that these can be done anytime in the next month, prior to follow-up, at any  lab at her convenience. The patient expressed understanding and scheduled one-month follow-up prior to leaving the office today.      #HTN  ::/86  Plan:  -Continue lisinopril 20 mg daily, refilled today  -Continue hydrochlorothiazide 12.5 mg daily, refilled today  -CMP ordered today     #Tobacco Use Disorder  #Bronchospasm  ::Previously reported  smoking 1PPD  -Stopped refilling chantix/nicotine patches as she states that she is not using them   -Low dose CT ordered in the past, not completed  Plan:  -Smoking cessation unable to be discussed at today's visit  -Will discuss at 1 month follow-up visit  -along with low dose chest CT counseling       #Meralgia Paresthetica, controlled  #Low back pain, s/p recent fall at home  ::Patient  was prescribed Soma previously by unknown physician, not refilled due to side effect profile and addictive potential   -Per HPI, pt experienced recent fall and was seen in ED   -Plans to start PT, per her   Plan:  -Continue Gabapentin 800 mg TID, refilled today  -Continue Gabapentin 300 mg at bedtime, refilled today   -Pt inquired about increasing her nighttime dose   -Will discuss at follow-up visit once she starts methocarbamol   -Discontinue baclofen 10 mg TID  -Start methocarbamol 500 mg BID, sent today  -Counseled patient to not mix baclofen and methocarbamol together, she must only take one    #HLD  #Hx of CVA, L MCA, stable  -lipid panel 03/2023              -total 163, , HDL 44  -Pt is overdue for repeat lipid panel  Plan:  -Repeat lipid panel, ordered today  -Continue atorvastatin 40 mg daily, refilled today  -Continue ASA 81 mg daily, refilled today     #Obesity  #Metabolic syndrome  #Pre-diabetes   :: A1C 6.3%. 03/2023  -Pt is overdue for repeat a1c  Plan:  -Repeat A1c, ordered today     #Panic Attacks  #Anxiety  -Sent referral for psychiatry previously, encouraged follow up   -Pt does not currently follow with psych  -Patient reports self stopping quetiapine in the past  -unable to fully discuss at today's visit   Plan:  -Continue Buspirone 15 mg BID, refilled today  -Continue Sertraline 100 mg daily, refilled today  -Will plan to discuss at follow-up visit in one month      #Health maintenance  - Last HbA1c: 6.3%. 03/2023, overdue  - ASCVD: 11.6% based on most recent 2023 lipids, overdue  - STOP-BANG: Not addressed this visit  - Infectious:  - Hep C Negative 2023   - HIV  Negative 2023   - Vaccinations:  - Tdap 2015   - Flu not addressed this visit   - VZV not addressed this visit   - PNA not addressed this visit   - Colonoscopy: Colonoscopy in 2014, recommended 5 year follow-up. Cologuard ordered Jan 2024 not completed. overdue  - Pap testing overdue, mammogram overdue  - Low-dose CT  chest: overdue  - Bone density: N/a  - AAA screening: N/a  Plan:  -Patient is to return to clinic in one month to discuss age-appropriate cancer screening (pap, mammogram, colonoscopy, LDCT)  -Ordered for A1c and lipid panel ordered today, pt to complete before follow-up in Jan 2025  -Will plan to discuss immunization at follow-up in Jan 2025 as well     Follow-up in one month.    Patient and plan discussed with attending physician Dr. Ceron.    Bev Gomez MD  Internal Medicine, PGY-1   [facility]

## 2024-12-11 NOTE — PATIENT INSTRUCTIONS
As discussed today, our plan is:     Labs - please get your labs collected prior to your next appointment  Medication changes: start methocarbamol (robaxin) 500 mg twice daily. STOP taking baclofen.   3.   If you smoke or use other tobacco products, take steps to quit. Call [phone] for more information or to set up an appointment with  [facility]. The [place] Tobacco Quit Line is a free resource for people who don’t have insurance, receive Medicaid, pregnant women, or members of the [place] [facility]. Call 3-158-QUIT-NOW or 1-261.652.7899.    Please come back to see us in: with-in the next 1 month or we will not be able to give you further refills.  ------  If you have any problems or questions, please contact the clinic at [phone] to leave a message. Our fax number is [phone]. If your issue cannot wait until the next business day, please go to urgent care or the emergency department.     I also strongly urge all of my patients to register for [facility] by going to: [email or web address]  (The  staff can also send you a text/email link to register when you check out).    No shows: It is understandable if you are unable to make it to a visit, but please cancel your appointment instead of not showing up. This helps to give other patients access to primary care and keeps wait times low.        [facility]   [phone]

## 2024-12-12 ENCOUNTER — APPOINTMENT (OUTPATIENT)
Dept: PRIMARY CARE | Facility: HOSPITAL | Age: 49
End: 2024-12-12
Payer: COMMERCIAL

## 2024-12-12 NOTE — PROGRESS NOTES
I saw and evaluated the patient. I personally obtained the key and critical portions of the history and physical exam or was physically present for key and critical portions performed by the resident/fellow. I reviewed the resident/fellow's documentation and discussed the patient with the resident/fellow. I agree with the resident/fellow's medical decision making as documented in the note.    Iban Ceron MD MPH

## 2025-01-07 DIAGNOSIS — I10 ESSENTIAL HYPERTENSION: ICD-10-CM

## 2025-01-08 ENCOUNTER — TELEPHONE (OUTPATIENT)
Dept: PRIMARY CARE | Facility: HOSPITAL | Age: 50
End: 2025-01-08
Payer: COMMERCIAL

## 2025-01-08 ENCOUNTER — APPOINTMENT (OUTPATIENT)
Dept: PRIMARY CARE | Facility: HOSPITAL | Age: 50
End: 2025-01-08
Payer: COMMERCIAL

## 2025-01-08 DIAGNOSIS — E78.5 DYSLIPIDEMIA: ICD-10-CM

## 2025-01-08 DIAGNOSIS — I63.312 CEREBROVASCULAR ACCIDENT (CVA) DUE TO THROMBOSIS OF LEFT MIDDLE CEREBRAL ARTERY (MULTI): ICD-10-CM

## 2025-01-08 DIAGNOSIS — F41.9 ANXIETY: ICD-10-CM

## 2025-01-08 DIAGNOSIS — M54.50 ACUTE MIDLINE LOW BACK PAIN WITHOUT SCIATICA: ICD-10-CM

## 2025-01-08 DIAGNOSIS — M79.2 NEUROPATHIC PAIN: ICD-10-CM

## 2025-01-08 RX ORDER — METHOCARBAMOL 500 MG/1
500 TABLET, FILM COATED ORAL 2 TIMES DAILY
Qty: 60 TABLET | Refills: 0 | Status: SHIPPED | OUTPATIENT
Start: 2025-01-08 | End: 2025-02-07

## 2025-01-08 RX ORDER — LISINOPRIL 20 MG/1
20 TABLET ORAL DAILY
Qty: 30 TABLET | Refills: 0 | Status: SHIPPED | OUTPATIENT
Start: 2025-01-08 | End: 2025-02-07

## 2025-01-08 RX ORDER — GABAPENTIN 300 MG/1
300 CAPSULE ORAL NIGHTLY
Qty: 30 CAPSULE | Refills: 0 | Status: SHIPPED | OUTPATIENT
Start: 2025-01-08 | End: 2025-02-07

## 2025-01-08 RX ORDER — ATORVASTATIN CALCIUM 40 MG/1
40 TABLET, FILM COATED ORAL DAILY
Qty: 30 TABLET | Refills: 0 | Status: SHIPPED | OUTPATIENT
Start: 2025-01-08 | End: 2025-02-07

## 2025-01-08 RX ORDER — ASPIRIN 81 MG/1
81 TABLET ORAL DAILY
Qty: 30 TABLET | Refills: 0 | Status: SHIPPED | OUTPATIENT
Start: 2025-01-08 | End: 2025-02-07

## 2025-01-08 RX ORDER — BUSPIRONE HYDROCHLORIDE 10 MG/1
20 TABLET ORAL 2 TIMES DAILY
Qty: 120 TABLET | Refills: 0 | Status: SHIPPED | OUTPATIENT
Start: 2025-01-08 | End: 2025-02-07

## 2025-01-08 RX ORDER — GABAPENTIN 800 MG/1
800 TABLET ORAL 3 TIMES DAILY
Qty: 90 TABLET | Refills: 0 | Status: SHIPPED | OUTPATIENT
Start: 2025-01-08 | End: 2025-02-07

## 2025-01-08 NOTE — TELEPHONE ENCOUNTER
Please refill medications patient had appointment today but missed appointment due to having Covid    Aspirin 81mg  Methocarbamol  Atorvastatin  Buspar  Gabapentine    [facility]

## 2025-01-08 NOTE — PROGRESS NOTES
I received a request to refill Aspirin 81 mg, Methocarbamol, Atorvastatin, Buspar, and Gabapentin, patient had an appointment today but she tested positive for Covid on 1/7, her appointment was rescheduled on 2/6, I refilled the requested medications until the next visit

## 2025-01-14 ENCOUNTER — APPOINTMENT (OUTPATIENT)
Dept: PRIMARY CARE | Facility: HOSPITAL | Age: 50
End: 2025-01-14
Payer: COMMERCIAL

## 2025-02-06 ENCOUNTER — OFFICE VISIT (OUTPATIENT)
Dept: PRIMARY CARE | Facility: HOSPITAL | Age: 50
End: 2025-02-06
Payer: MEDICARE

## 2025-02-06 VITALS
HEART RATE: 103 BPM | BODY MASS INDEX: 30.85 KG/M2 | SYSTOLIC BLOOD PRESSURE: 129 MMHG | DIASTOLIC BLOOD PRESSURE: 82 MMHG | OXYGEN SATURATION: 98 % | WEIGHT: 197 LBS | TEMPERATURE: 97.9 F

## 2025-02-06 DIAGNOSIS — I63.312 CEREBROVASCULAR ACCIDENT (CVA) DUE TO THROMBOSIS OF LEFT MIDDLE CEREBRAL ARTERY (MULTI): ICD-10-CM

## 2025-02-06 DIAGNOSIS — G89.29 CHRONIC SHOULDER PAIN, UNSPECIFIED LATERALITY: ICD-10-CM

## 2025-02-06 DIAGNOSIS — M25.519 CHRONIC SHOULDER PAIN, UNSPECIFIED LATERALITY: ICD-10-CM

## 2025-02-06 DIAGNOSIS — Z00.00 ANNUAL PHYSICAL EXAM: Primary | ICD-10-CM

## 2025-02-06 DIAGNOSIS — Z53.20 ANNUAL VISIT DECLINED: ICD-10-CM

## 2025-02-06 DIAGNOSIS — F17.210 SMOKING GREATER THAN 20 PACK YEARS: ICD-10-CM

## 2025-02-06 DIAGNOSIS — E78.5 DYSLIPIDEMIA: ICD-10-CM

## 2025-02-06 DIAGNOSIS — G47.00 INSOMNIA, UNSPECIFIED TYPE: ICD-10-CM

## 2025-02-06 DIAGNOSIS — R09.82 PND (POST-NASAL DRIP): ICD-10-CM

## 2025-02-06 DIAGNOSIS — I10 ESSENTIAL HYPERTENSION: ICD-10-CM

## 2025-02-06 DIAGNOSIS — M54.50 ACUTE MIDLINE LOW BACK PAIN WITHOUT SCIATICA: ICD-10-CM

## 2025-02-06 DIAGNOSIS — J34.2 DEVIATED SEPTUM: ICD-10-CM

## 2025-02-06 DIAGNOSIS — F41.9 ANXIETY: ICD-10-CM

## 2025-02-06 DIAGNOSIS — R10.13 DYSPEPSIA: ICD-10-CM

## 2025-02-06 DIAGNOSIS — M79.2 NEUROPATHIC PAIN: ICD-10-CM

## 2025-02-06 DIAGNOSIS — F17.200 TOBACCO USE DISORDER: ICD-10-CM

## 2025-02-06 RX ORDER — METHOCARBAMOL 500 MG/1
500 TABLET, FILM COATED ORAL 2 TIMES DAILY
Qty: 60 TABLET | Refills: 1 | Status: SHIPPED | OUTPATIENT
Start: 2025-02-06 | End: 2025-02-06

## 2025-02-06 RX ORDER — GABAPENTIN 800 MG/1
800 TABLET ORAL 3 TIMES DAILY
Qty: 90 TABLET | Refills: 0 | Status: SHIPPED | OUTPATIENT
Start: 2025-02-06 | End: 2025-02-06 | Stop reason: ALTCHOICE

## 2025-02-06 RX ORDER — GABAPENTIN 300 MG/1
300 CAPSULE ORAL NIGHTLY
Qty: 30 CAPSULE | Refills: 0 | Status: SHIPPED | OUTPATIENT
Start: 2025-02-06 | End: 2025-03-08

## 2025-02-06 RX ORDER — ASPIRIN 81 MG/1
81 TABLET ORAL DAILY
Qty: 30 TABLET | Refills: 0 | Status: SHIPPED | OUTPATIENT
Start: 2025-02-06 | End: 2025-03-08

## 2025-02-06 RX ORDER — LISINOPRIL 20 MG/1
20 TABLET ORAL DAILY
Qty: 30 TABLET | Refills: 2 | Status: SHIPPED | OUTPATIENT
Start: 2025-02-06 | End: 2025-05-07

## 2025-02-06 RX ORDER — HYDROCHLOROTHIAZIDE 12.5 MG/1
12.5 TABLET ORAL DAILY
Qty: 30 TABLET | Refills: 11 | Status: SHIPPED | OUTPATIENT
Start: 2025-02-06 | End: 2026-02-06

## 2025-02-06 RX ORDER — BUSPIRONE HYDROCHLORIDE 10 MG/1
20 TABLET ORAL 2 TIMES DAILY
Qty: 120 TABLET | Refills: 0 | Status: SHIPPED | OUTPATIENT
Start: 2025-02-06 | End: 2025-03-08

## 2025-02-06 RX ORDER — SERTRALINE HYDROCHLORIDE 100 MG/1
100 TABLET, FILM COATED ORAL DAILY
Qty: 30 TABLET | Refills: 0 | Status: SHIPPED | OUTPATIENT
Start: 2025-02-06 | End: 2025-03-08

## 2025-02-06 RX ORDER — LISINOPRIL 20 MG/1
20 TABLET ORAL DAILY
Qty: 30 TABLET | Refills: 0 | Status: SHIPPED | OUTPATIENT
Start: 2025-02-06 | End: 2025-02-06

## 2025-02-06 RX ORDER — ATORVASTATIN CALCIUM 40 MG/1
40 TABLET, FILM COATED ORAL DAILY
Qty: 30 TABLET | Refills: 0 | Status: SHIPPED | OUTPATIENT
Start: 2025-02-06 | End: 2025-02-06

## 2025-02-06 RX ORDER — SERTRALINE HYDROCHLORIDE 100 MG/1
100 TABLET, FILM COATED ORAL DAILY
Qty: 30 TABLET | Refills: 0 | Status: SHIPPED | OUTPATIENT
Start: 2025-02-06 | End: 2025-02-06

## 2025-02-06 RX ORDER — GABAPENTIN 800 MG/1
800 TABLET ORAL 3 TIMES DAILY
Qty: 90 TABLET | Refills: 2 | Status: SHIPPED | OUTPATIENT
Start: 2025-02-06 | End: 2025-05-07

## 2025-02-06 RX ORDER — FLUTICASONE PROPIONATE 50 MCG
2 SPRAY, SUSPENSION (ML) NASAL DAILY
Qty: 16 G | Refills: 5 | Status: SHIPPED | OUTPATIENT
Start: 2025-02-06 | End: 2026-02-06

## 2025-02-06 RX ORDER — BACLOFEN 10 MG/1
10 TABLET ORAL 3 TIMES DAILY
Qty: 90 TABLET | Refills: 2 | Status: SHIPPED | OUTPATIENT
Start: 2025-02-06 | End: 2025-02-06

## 2025-02-06 RX ORDER — BACLOFEN 10 MG/1
10 TABLET ORAL DAILY
Qty: 30 TABLET | Refills: 1 | Status: SHIPPED | OUTPATIENT
Start: 2025-02-06 | End: 2025-04-07

## 2025-02-06 RX ORDER — PANTOPRAZOLE SODIUM 40 MG/1
40 TABLET, DELAYED RELEASE ORAL DAILY
Qty: 30 TABLET | Refills: 1 | Status: SHIPPED | OUTPATIENT
Start: 2025-02-06 | End: 2025-04-07

## 2025-02-06 RX ORDER — AMMONIUM LACTATE 12 G/100G
CREAM TOPICAL 2 TIMES DAILY
Qty: 90 G | Refills: 2 | Status: SHIPPED | OUTPATIENT
Start: 2025-02-06

## 2025-02-06 RX ORDER — IPRATROPIUM BROMIDE 42 UG/1
SPRAY, METERED NASAL
Qty: 15 ML | Refills: 2 | Status: SHIPPED | OUTPATIENT
Start: 2025-02-06

## 2025-02-06 RX ORDER — FOLIC ACID 1 MG/1
1 TABLET ORAL DAILY
Qty: 30 TABLET | Refills: 0 | Status: SHIPPED | OUTPATIENT
Start: 2025-02-06 | End: 2025-03-08

## 2025-02-06 RX ORDER — HYDROCHLOROTHIAZIDE 12.5 MG/1
12.5 TABLET ORAL DAILY
Qty: 30 TABLET | Refills: 11 | Status: SHIPPED | OUTPATIENT
Start: 2025-02-06 | End: 2025-02-06

## 2025-02-06 RX ORDER — PANTOPRAZOLE SODIUM 40 MG/1
40 TABLET, DELAYED RELEASE ORAL DAILY
Qty: 30 TABLET | Refills: 1 | Status: SHIPPED | OUTPATIENT
Start: 2025-02-06 | End: 2025-02-06

## 2025-02-06 RX ORDER — ACETAMINOPHEN 500 MG
5 TABLET ORAL NIGHTLY PRN
Qty: 30 TABLET | Refills: 6 | Status: SHIPPED | OUTPATIENT
Start: 2025-02-06 | End: 2025-02-06

## 2025-02-06 RX ORDER — BUSPIRONE HYDROCHLORIDE 10 MG/1
20 TABLET ORAL 2 TIMES DAILY
Qty: 120 TABLET | Refills: 0 | Status: SHIPPED | OUTPATIENT
Start: 2025-02-06 | End: 2025-02-06

## 2025-02-06 RX ORDER — FOLIC ACID 1 MG/1
1 TABLET ORAL DAILY
Qty: 30 TABLET | Refills: 1 | Status: SHIPPED | OUTPATIENT
Start: 2025-02-06 | End: 2025-02-06

## 2025-02-06 RX ORDER — ACETAMINOPHEN 500 MG
5 TABLET ORAL NIGHTLY PRN
Qty: 30 TABLET | Refills: 0 | Status: SHIPPED | OUTPATIENT
Start: 2025-02-06 | End: 2025-03-08

## 2025-02-06 RX ORDER — GABAPENTIN 300 MG/1
300 CAPSULE ORAL NIGHTLY
Qty: 30 CAPSULE | Refills: 0 | Status: SHIPPED | OUTPATIENT
Start: 2025-02-06 | End: 2025-02-06 | Stop reason: ALTCHOICE

## 2025-02-06 RX ORDER — METHOCARBAMOL 500 MG/1
500 TABLET, FILM COATED ORAL 2 TIMES DAILY
Qty: 60 TABLET | Refills: 0 | Status: SHIPPED | OUTPATIENT
Start: 2025-02-06 | End: 2025-03-08

## 2025-02-06 RX ORDER — ATORVASTATIN CALCIUM 40 MG/1
40 TABLET, FILM COATED ORAL DAILY
Qty: 30 TABLET | Refills: 0 | Status: SHIPPED | OUTPATIENT
Start: 2025-02-06 | End: 2025-03-08

## 2025-02-06 ASSESSMENT — ENCOUNTER SYMPTOMS
LOSS OF SENSATION IN FEET: 0
DEPRESSION: 0
OCCASIONAL FEELINGS OF UNSTEADINESS: 0

## 2025-02-06 NOTE — PROGRESS NOTES
Chief complaint :   Refill     HPI: Jud Rhodes is a 49 y.o. 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      Interval History/History per chart review:  She was last seen in our clinic around 1 months ago with the promise to do labs and Follow up on discussions for the health maintenance. 30 days prescription were given for her to present to our clinic 2/6/2025.     Subjective:   She was seen today, she was observed appearing upset regarding her medications. She was in a rush to  her niece and indicated that she has already commenced physical therapy sessions at home, reporting that she has been doing well otherwise.   She stated that she currently has no intention of quitting smoking but will continue to follow up with us for the necessary investigations and blood work. When inquired about the reason for the labs not being completed, she explained that she has been quite busy and has a fear of needles.   Additionally, she requested the paperwork and refills to take home, as she can not be late.         Health maintenance:  Health Maintenance   Topic Date Due    Medicare Annual Wellness Visit (AWV)  Never done    Colorectal Cancer Screening  Never done    MMR Vaccines (1 of 1 - Standard series) Never done    Pneumococcal Vaccine (1 of 2 - PCV) Never done    Cervical Cancer Screening  Never done    Hepatitis B Vaccines (2 of 3 - Hep B Twinrix 3-dose series) 04/09/2009    Hepatitis A Vaccines (2 of 2 - Risk 2-dose series) 09/12/2009    Mammogram  07/26/2023    Zoster Vaccines (1 of 2) Never done    Diabetes: Hemoglobin A1C  03/28/2024    Diabetes Screening  03/28/2024    Influenza Vaccine (1) 09/01/2024    COVID-19 Vaccine (3 - 2024-25 season) 09/01/2024    DTaP/Tdap/Td Vaccines (2 - Td or Tdap) 05/19/2025    Lipid Panel  03/28/2028    HIV Screening   Completed    Hepatitis C Screening  Completed    HIB Vaccines  Aged Out    IPV Vaccines  Aged Out    Meningococcal Vaccine  Aged Out    Rotavirus Vaccines  Aged Out    HPV Vaccines  Aged Out       Medications:    Current Outpatient Medications:     albuterol 90 mcg/actuation inhaler, Inhale 1-2 puffs every 6 hours if needed for shortness of breath. 4-6 hours as needed, Disp: 18 g, Rfl: 6    ammonium lactate (Amlactin) 12 % cream, Apply topically 2 times a day. to affected area, Disp: 90 g, Rfl: 2    aspirin 81 mg EC tablet, Take 1 tablet (81 mg) by mouth once daily., Disp: 30 tablet, Rfl: 0    atorvastatin (Lipitor) 40 mg tablet, Take 1 tablet (40 mg) by mouth once daily., Disp: 30 tablet, Rfl: 0    baclofen (Lioresal) 10 mg tablet, Take 1 tablet (10 mg) by mouth once daily., Disp: 30 tablet, Rfl: 1    buprenorphine-naloxone (Suboxone) 8-2 mg SL film, , Disp: , Rfl:     buprenorphine-naloxone (Suboxone) 8-2 mg SL tablet, Place 1 tablet under the tongue twice a day., Disp: , Rfl:     busPIRone (Buspar) 10 mg tablet, Take 2 tablets (20 mg) by mouth 2 times a day., Disp: 120 tablet, Rfl: 0    fluticasone (Flonase) 50 mcg/actuation nasal spray, Administer 2 sprays into each nostril once daily. Shake gently. Before first use, prime pump. After use, clean tip and replace cap., Disp: 16 g, Rfl: 5    folic acid (Folvite) 1 mg tablet, Take 1 tablet (1 mg) by mouth once daily., Disp: 30 tablet, Rfl: 0    gabapentin (Neurontin) 300 mg capsule, Take 1 capsule (300 mg) by mouth once daily at bedtime., Disp: 30 capsule, Rfl: 0    gabapentin (Neurontin) 800 mg tablet, Take 1 tablet (800 mg) by mouth 3 times a day., Disp: 90 tablet, Rfl: 2    hydroCHLOROthiazide (Microzide) 12.5 mg tablet, Take 1 tablet (12.5 mg) by mouth once daily., Disp: 30 tablet, Rfl: 11    ipratropium (Atrovent) 42 mcg (0.06 %) nasal spray, ADMINISTER TWO (2) SPRAYS INTO EACH NOSTRIL THREE (3) TIMES A DAY., Disp: 15 mL, Rfl: 2    ipratropium-albuteroL  (Duo-Neb) 0.5-2.5 mg/3 mL nebulizer solution, Inhale 3 mL., Disp: , Rfl:     lidocaine (lidocaine HCL) 4 % cream, Apply topically., Disp: , Rfl:     lisinopril 20 mg tablet, Take 1 tablet (20 mg) by mouth once daily., Disp: 30 tablet, Rfl: 2    melatonin 5 mg tablet, Take 1 tablet (5 mg) by mouth as needed at bedtime for sleep., Disp: 30 tablet, Rfl: 0    methocarbamol (Robaxin) 500 mg tablet, Take 1 tablet (500 mg) by mouth 2 times a day., Disp: 60 tablet, Rfl: 0    multivitamin tablet, TAKE ONE (1) TABLET DAILY., Disp: 90 tablet, Rfl: 2    multivitamin with minerals (Multiple Vitamin-Minerals) tablet, Take 1 tablet by mouth once daily., Disp: , Rfl:     naloxone (Narcan) 4 mg/0.1 mL nasal spray, Administer 1 spray (4 mg) into affected nostril(s)., Disp: , Rfl:     nicotine (Nicoderm CQ) 21 mg/24 hr patch, Place 1 patch on the skin once every 24 hours., Disp: , Rfl:     nicotine polacrilex (Nicorette) 2 mg gum, Take 1 each (2 mg) by mouth if needed for smoking cessation., Disp: , Rfl:     pantoprazole (ProtoNix) 40 mg EC tablet, Take 1 tablet (40 mg) by mouth once daily. Do not crush, chew, or split., Disp: 30 tablet, Rfl: 1    polyethylene glycol-electrolytes 420 gram solution, Take 4,000 mL by mouth., Disp: , Rfl:     sertraline (Zoloft) 100 mg tablet, Take 1 tablet (100 mg) by mouth once daily., Disp: 30 tablet, Rfl: 0    SSD 1 % cream, Apply topically once daily. to affected area, Disp: , Rfl:     Require med refills? Yes  Preferred pharmacy: [facility]       Past medical history:  Past Medical History:   Diagnosis Date    Alcohol use, unspecified with withdrawal delirium (Multi) 01/14/2021    Alcohol withdrawal syndrome, with delirium    Cerebral infarction due to unspecified occlusion or stenosis of left middle cerebral artery (Multi) 01/14/2021    Cerebral infarction due to occlusion or stenosis of left middle cerebral artery    Cocaine use, unspecified with intoxication delirium (Multi)  01/14/2021    Cocaine intoxication delirium    Other specified symptoms and signs involving the circulatory and respiratory systems 11/22/2021    Choking episode occurring both during daytime and at night         Allergies:  No Known Allergies    Surgical history:  Past Surgical History:   Procedure Laterality Date    MR CHEST ANGIO WO IV CONTRAST  10/11/2020    MR CHEST ANGIO WO IV CONTRAST 10/11/2020 [facility] CLINICAL LEGACY    OTHER SURGICAL HISTORY  01/15/2021    Knee surgery    OTHER SURGICAL HISTORY  01/15/2021    Wrist surgery    OTHER SURGICAL HISTORY  01/14/2021    Thrombectomy       Family history:  Family History   Problem Relation Name Age of Onset    Hypertension Mother         Social history:   reports that she has been smoking cigarettes. She has never used smokeless tobacco. She reports that she does not drink alcohol and does not use drugs.    Social History     Social History Narrative    Not on file       1. Living situation: unable to discuss today  2. Work:   3. Alcohol:   4. Tobacco:  5. Other drugs:   6. Diet/exercise:     Safety:  - Housing insecurity  - Food insecurity  - Weapons in the home:     Review of systems:  As per HPI      OBJECTIVE:  Vitals:  Vitals:    02/06/25 1453   BP: 129/82   Pulse: 103   Temp: 36.6 °C (97.9 °F)   SpO2: 98%       Physical exam:  VS   She looked anxious wearing her coat ready to go.   She was Ax3   Full physical Examination was not done during this visit     Labs:  No results found for this or any previous visit (from the past 24 hours).    Imaging:  XR lumbar spine 2-3 views    Result Date: 11/25/2024  * * *Final Report* * * DATE OF EXAM: Nov 25 2024  4:58PM   LUX   [id number]  -  XR LUMBAR 3V AP/LAT/L5-S1  / ACCESSION #  [id number] PROCEDURE REASON: Back pain      * * * * Physician Interpretation * * * *  EXAMINATION:  XR LUMBAR 3V AP/LAT/L5-S1 PATIENT/TECHNOLOGIST PROVIDED HISTORY: trauma CLINICAL INFORMATION:  49 years old Female with Back pain TECHNIQUE: XR LUMBAR  3V AP/LAT/L5-S1    Laterality:  NOT APPLICABLE    Number of different views (projections): 3 COMPARISON: None RESULT: Lumbar spine: Counting reference:  Lumbosacral junction.  For the purposes of this report, L4-5 is considered the level of the iliac crest and there are 5 lumbar-type vertebrae. Anatomic Variant: Transitional L5 vertebral body. Post-op assessment:  N/A Alignment:  Alignment is satisfactory. Vertebral bodies:  Vertebral body heights are maintained. Spine articulations:  Mild-moderate disc space narrowing L5-S1. Remainder of the disc spaces appear maintained Other: Sacroiliac joints are symmetric and maintained.    IMPRESSION: No radiographic evidence of acute osseous abnormality in the lumbar spine. Degenerative changes as described. : TYLER   Transcribe Date/Time: Nov 25 2024  5:03P Dictated by : RAHUL FERNÁNDEZ DO This examination was interpreted and the report reviewed and electronically signed by: RAHUL FERNÁNDEZ DO on Nov 25 2024  5:18PM  EST    XR KNEE INJURY 4V AP/LAT/OBLS BILATERAL    Result Date: 11/25/2024  * * *Final Report* * * DATE OF EXAM: Nov 25 2024  4:58PM   LUX   [id number]  -  XR KNEE 4V AP/LAT/OBLS  JIM  / ACCESSION #  [id number] PROCEDURE REASON: Trauma      * * * * Physician Interpretation * * * *  EXAMINATION:  XR KNEE 4V AP/LAT/OBLS  JIM PATIENT/TECHNOLOGIST PROVIDED HISTORY: trauma CLINICAL INFORMATION:  49 years old Female with Trauma TECHNIQUE: XR KNEE 4V AP/LAT/OBLS  JIM    Laterality:  BILATERAL    Number of different views (projections): 4 views of each knee COMPARISON: Right knee radiographs 3/8/2009 RESULT: No acute fracture. Corticated ossicle adjacent to the RIGHT fibular head which may be related to remote injury. No joint effusion in either knee. Joint spaces are maintained on these nonweightbearing views. Bilateral extensor mechanism enthesophytes.    IMPRESSION: No acute osseous abnormality. : TYLER   Transcribe Date/Time: Nov 25 2024  4:59P  Dictated by : RAHUL FERNÁNDEZ DO This examination was interpreted and the report reviewed and electronically signed by: RAHUL FERNÁNDEZ DO on Nov 25 2024  5:02PM  EST      ASSESSMENT and PLAN:  Jud Rhodes is a 49 y.o. female with a PMHx significant for L MCA CVA (c/b SAH with some residual aphasia), HTN, HLD, lower back pain, pre-diabetes, anxiety, and tobacco use disorder who presents to [facility] today for medical refill, after failing to do labs as promised in the last visit.       Updates 2/6/25:  I  discussed with her today regarding the necessity of laboratory tests to assess renal and liver function, which is essential for monitoring her medication and determining if any adjustments are required. Additionally, I addressed the importance of a low-dose CT scan for cancer screening, to which she acknowledged the associated risks and expressed her willingness to proceed with the scan.    She requested that her blood work be conducted at the laboratory instead of the clinic, as she needed to  her niece from school. Furthermore, she exhibited no interest in discussing smoking cessation and was reluctant to engage in conversations about other health maintenance strategies.    She assured me that she would complete her laboratory tests tomorrow. I will follow up with her then to document whether she has completed the tests (she provided me with the phone number: [phone]). I prescribed a 30-day supply of her medications; if the laboratory tests are not completed, she will need to return in 30 days for a refill and further lab work.       #HTN  ::/80  Plan:  -Continue lisinopril 20 mg daily, refilled today  -Continue hydrochlorothiazide 12.5 mg daily, refilled today  -CMP ordered today AGAIN      #Tobacco Use Disorder  #Bronchospasm  ::Previously reported  smoking 1PPD  -Stopped refilling chantix/nicotine patches as she states that she is not using them   -Low dose CT ordered in the past,  not completed  Plan:  -Smoking cessation , she have no interest   - CT low dose ordered.         #Meralgia Paresthetica, controlled  #Low back pain, s/p recent fall at home  ::Patient was prescribed Soma previously by unknown physician, not refilled due to side effect profile and addictive potential   -Per HPI, pt experienced recent fall and was seen in ED  - She already started PT   Plan:  -Continue Gabapentin 800 mg TID, refilled today  -Continue Gabapentin 300 mg at bedtime, refilled today   -Pt inquired about increasing her nighttime dose   -Will discuss at follow-up visit once she starts methocarbamol   -Discontinue baclofen 10 mg TID  -Start methocarbamol 500 mg BID, sent today  -Counseled patient to not mix baclofen and methocarbamol together, she must only take one    #HLD  #Hx of CVA, L MCA, stable  -lipid panel 03/2023              -total 163, , HDL 44  -Pt is overdue for repeat lipid panel  Plan:  -Repeat lipid panel, ordered today  -Continue atorvastatin 40 mg daily, refilled today  -Continue ASA 81 mg daily, refilled today     #Obesity  #Metabolic syndrome  #Pre-diabetes   :: A1C 6.3%. 03/2023  -Pt is overdue for repeat a1c  Plan:  -Repeat A1c, ordered today ( AGAIN)      #Panic Attacks  #Anxiety  -Sent referral for psychiatry previously, encouraged follow up   -Pt does not currently follow with psych  -Patient reports self stopping quetiapine in the past  -unable to fully discuss at today's visit   Plan:  -Continue Buspirone 15 mg BID, refilled today  -Continue Sertraline 100 mg daily, refilled today  -Will plan to discuss at follow-up visit in one month      #Health maintenance  - Last HbA1c: 6.3%. 03/2023, overdue  - ASCVD: 11.6% based on most recent 2023 lipids, overdue  - STOP-BANG: Not addressed this visit  - Infectious:  - Hep C Negative 2023   - HIV  Negative 2023   - Vaccinations:  - Tdap 2015   - Flu not addressed this visit   - VZV not addressed this visit   - PNA not addressed this  visit   - Colonoscopy: Colonoscopy in 2014, recommended 5 year follow-up. Cologuard ordered Jan 2024 not completed. overdue  - Pap testing overdue, mammogram overdue  - Low-dose CT chest: overdue  - Bone density: N/a  - AAA screening: N/a  Plan:  -Patient is to return to clinic in one month to discuss age-appropriate cancer screening (pap, mammogram, colonoscopy, LDCT)  -Ordered for A1c and lipid panel ordered today, pt to complete before follow-up in Jan 2025  -Will plan to discuss immunization at follow-up in Jan 2025 as well     Follow-up in one month.    Patient and plan discussed with attending physician Dr. Ceron.    Jessika Hutchison MD   PGY 2

## 2025-02-07 ENCOUNTER — TELEPHONE (OUTPATIENT)
Dept: PRIMARY CARE | Facility: HOSPITAL | Age: 50
End: 2025-02-07
Payer: COMMERCIAL

## 2025-02-07 NOTE — TELEPHONE ENCOUNTER
Called patient and they did not reply. Will forward to [facility] to follow up Monday to find out which meds pt is missing

## 2025-02-08 DIAGNOSIS — F41.9 ANXIETY: ICD-10-CM

## 2025-02-08 DIAGNOSIS — R10.13 DYSPEPSIA: ICD-10-CM

## 2025-02-08 DIAGNOSIS — J34.2 DEVIATED SEPTUM: ICD-10-CM

## 2025-02-08 DIAGNOSIS — F17.200 TOBACCO USE DISORDER: ICD-10-CM

## 2025-02-10 ENCOUNTER — DOCUMENTATION (OUTPATIENT)
Dept: PRIMARY CARE | Facility: HOSPITAL | Age: 50
End: 2025-02-10
Payer: COMMERCIAL

## 2025-02-10 DIAGNOSIS — F41.9 ANXIETY: ICD-10-CM

## 2025-02-10 DIAGNOSIS — M79.2 NEUROPATHIC PAIN: ICD-10-CM

## 2025-02-10 DIAGNOSIS — R09.82 PND (POST-NASAL DRIP): ICD-10-CM

## 2025-02-10 DIAGNOSIS — R10.13 DYSPEPSIA: ICD-10-CM

## 2025-02-10 DIAGNOSIS — E78.5 DYSLIPIDEMIA: ICD-10-CM

## 2025-02-10 DIAGNOSIS — I63.312 CEREBROVASCULAR ACCIDENT (CVA) DUE TO THROMBOSIS OF LEFT MIDDLE CEREBRAL ARTERY (MULTI): ICD-10-CM

## 2025-02-10 DIAGNOSIS — J34.2 DEVIATED SEPTUM: ICD-10-CM

## 2025-02-10 DIAGNOSIS — M54.50 ACUTE MIDLINE LOW BACK PAIN WITHOUT SCIATICA: ICD-10-CM

## 2025-02-10 RX ORDER — PANTOPRAZOLE SODIUM 40 MG/1
TABLET, DELAYED RELEASE ORAL
Qty: 30 TABLET | Refills: 2 | OUTPATIENT
Start: 2025-02-10

## 2025-02-10 RX ORDER — ASPIRIN 81 MG/1
81 TABLET ORAL DAILY
Qty: 30 TABLET | Refills: 2 | Status: SHIPPED | OUTPATIENT
Start: 2025-02-10

## 2025-02-10 RX ORDER — IPRATROPIUM BROMIDE 42 UG/1
SPRAY, METERED NASAL
Refills: 2 | OUTPATIENT
Start: 2025-02-10

## 2025-02-10 RX ORDER — PANTOPRAZOLE SODIUM 40 MG/1
40 TABLET, DELAYED RELEASE ORAL DAILY
Qty: 30 TABLET | Refills: 1 | Status: SHIPPED | OUTPATIENT
Start: 2025-02-10 | End: 2025-04-11

## 2025-02-10 RX ORDER — SERTRALINE HYDROCHLORIDE 100 MG/1
TABLET, FILM COATED ORAL
Qty: 30 TABLET | Refills: 2 | OUTPATIENT
Start: 2025-02-10

## 2025-02-10 RX ORDER — FOLIC ACID 1 MG/1
TABLET ORAL
Qty: 30 TABLET | Refills: 2 | OUTPATIENT
Start: 2025-02-10

## 2025-02-10 RX ORDER — ATORVASTATIN CALCIUM 40 MG/1
40 TABLET, FILM COATED ORAL DAILY
Qty: 30 TABLET | Refills: 2 | Status: SHIPPED | OUTPATIENT
Start: 2025-02-10

## 2025-02-10 RX ORDER — BUSPIRONE HYDROCHLORIDE 10 MG/1
20 TABLET ORAL 2 TIMES DAILY
Qty: 120 TABLET | Refills: 0 | Status: SHIPPED | OUTPATIENT
Start: 2025-02-10 | End: 2025-03-12

## 2025-02-10 RX ORDER — FLUTICASONE PROPIONATE 50 MCG
2 SPRAY, SUSPENSION (ML) NASAL DAILY
Qty: 16 G | Refills: 5 | Status: SHIPPED | OUTPATIENT
Start: 2025-02-10 | End: 2025-03-12

## 2025-02-10 RX ORDER — METHOCARBAMOL 500 MG/1
500 TABLET, FILM COATED ORAL 2 TIMES DAILY
Qty: 60 TABLET | Refills: 0 | Status: SHIPPED | OUTPATIENT
Start: 2025-02-10 | End: 2025-03-12

## 2025-02-10 RX ORDER — IPRATROPIUM BROMIDE 42 UG/1
SPRAY, METERED NASAL
Qty: 15 ML | Refills: 2 | Status: SHIPPED | OUTPATIENT
Start: 2025-02-10

## 2025-02-10 NOTE — PROGRESS NOTES
The medications seems to be not sent to the pharmacy, Called the pharmacy and verbal order was done for BP and Gabapentin medications. Patients contacted again and asked about the other medications to be refilled with extended time. We discussed that if she comes on Friday to do the labs she can take them from [facility] however she refused to that and wanted all her meds to re prescribed. Prescription for 30 days was done.  She was very upset over the encounter and most likely will not do her labs.

## 2025-03-04 ENCOUNTER — TELEPHONE (OUTPATIENT)
Dept: PRIMARY CARE | Facility: CLINIC | Age: 50
End: 2025-03-04
Payer: COMMERCIAL

## 2025-03-10 DIAGNOSIS — J98.01 BRONCHOSPASM: ICD-10-CM

## 2025-03-10 DIAGNOSIS — I63.312 CEREBROVASCULAR ACCIDENT (CVA) DUE TO THROMBOSIS OF LEFT MIDDLE CEREBRAL ARTERY (MULTI): ICD-10-CM

## 2025-03-10 DIAGNOSIS — F41.9 ANXIETY: ICD-10-CM

## 2025-03-10 DIAGNOSIS — M54.50 ACUTE MIDLINE LOW BACK PAIN WITHOUT SCIATICA: ICD-10-CM

## 2025-03-10 DIAGNOSIS — M79.2 NEUROPATHIC PAIN: ICD-10-CM

## 2025-03-10 DIAGNOSIS — R73.03 PREDIABETES: ICD-10-CM

## 2025-03-10 DIAGNOSIS — F17.200 TOBACCO USE DISORDER: ICD-10-CM

## 2025-03-10 NOTE — TELEPHONE ENCOUNTER
Patient called requesting refills, she can't recall which medications they are.       Would like current medication list to be called in for refills.

## 2025-03-10 NOTE — TELEPHONE ENCOUNTER
Has not completed any of the blood work assigned in last visit. Per last note, will need a visit or blood work to get the refills.

## 2025-03-12 DIAGNOSIS — R73.03 PREDIABETES: ICD-10-CM

## 2025-03-12 DIAGNOSIS — F41.9 ANXIETY: ICD-10-CM

## 2025-03-12 DIAGNOSIS — F17.200 TOBACCO USE DISORDER: ICD-10-CM

## 2025-03-12 RX ORDER — FOLIC ACID 1 MG/1
1 TABLET ORAL DAILY
Qty: 30 TABLET | Refills: 0 | Status: CANCELLED | OUTPATIENT
Start: 2025-03-12 | End: 2025-04-11

## 2025-03-12 RX ORDER — SERTRALINE HYDROCHLORIDE 100 MG/1
100 TABLET, FILM COATED ORAL DAILY
Qty: 30 TABLET | Refills: 11 | Status: SHIPPED | OUTPATIENT
Start: 2025-03-12 | End: 2026-03-07

## 2025-03-12 RX ORDER — IPRATROPIUM BROMIDE AND ALBUTEROL SULFATE 2.5; .5 MG/3ML; MG/3ML
3 SOLUTION RESPIRATORY (INHALATION)
Qty: 270 ML | Refills: 0 | OUTPATIENT
Start: 2025-03-12 | End: 2025-04-11

## 2025-03-12 RX ORDER — SERTRALINE HYDROCHLORIDE 100 MG/1
100 TABLET, FILM COATED ORAL DAILY
Qty: 30 TABLET | Refills: 0 | Status: CANCELLED | OUTPATIENT
Start: 2025-03-12 | End: 2025-04-11

## 2025-03-12 RX ORDER — MULTIVITAMIN
1 TABLET ORAL DAILY
Qty: 90 TABLET | Refills: 2 | Status: CANCELLED | OUTPATIENT
Start: 2025-03-12

## 2025-03-12 RX ORDER — FOLIC ACID 1 MG/1
1 TABLET ORAL DAILY
Qty: 30 TABLET | Refills: 11 | Status: SHIPPED | OUTPATIENT
Start: 2025-03-12 | End: 2026-03-07

## 2025-03-12 RX ORDER — METHOCARBAMOL 500 MG/1
500 TABLET, FILM COATED ORAL 2 TIMES DAILY
Qty: 60 TABLET | Refills: 0 | OUTPATIENT
Start: 2025-03-12 | End: 2025-04-11

## 2025-03-12 RX ORDER — MULTIVITAMIN
1 TABLET ORAL DAILY
Qty: 90 TABLET | Refills: 3 | Status: SHIPPED | OUTPATIENT
Start: 2025-03-12

## 2025-03-12 RX ORDER — GABAPENTIN 800 MG/1
800 TABLET ORAL 3 TIMES DAILY
Qty: 90 TABLET | Refills: 0 | OUTPATIENT
Start: 2025-03-12 | End: 2025-06-10

## 2025-03-12 RX ORDER — GABAPENTIN 300 MG/1
300 CAPSULE ORAL NIGHTLY
Qty: 30 CAPSULE | Refills: 0 | OUTPATIENT
Start: 2025-03-12 | End: 2025-04-11

## 2025-03-12 NOTE — TELEPHONE ENCOUNTER
Patient called upset about not getting her medications. Patient was advised she will need to schedule an appointment to get meds refilled. Patient states she would like a call back from a doctor to explain why she needs to come in. Patient can be reached at [phone]

## 2025-03-12 NOTE — PROGRESS NOTES
Called patient to discuss refills. Patient is requesting gabapentin and methocarbamol. Previous providers have clearly documented they wanted patient to get blood work and follow up in clinic prior to further scripts being sent. This was communicated to patient. Educated patient that labwork is important to ensuring safe dosing and overall patient safety. I told patient that I was not going to refill gabapentin or methocarbamol at this time, but will fill zoloft, MVI, and folic acid.

## 2025-04-10 ENCOUNTER — APPOINTMENT (OUTPATIENT)
Dept: PRIMARY CARE | Facility: HOSPITAL | Age: 50
End: 2025-04-10
Payer: COMMERCIAL

## 2025-04-15 ENCOUNTER — TELEPHONE (OUTPATIENT)
Dept: PRIMARY CARE | Facility: HOSPITAL | Age: 50
End: 2025-04-15
Payer: COMMERCIAL

## 2025-04-15 DIAGNOSIS — M79.2 NEUROPATHIC PAIN: ICD-10-CM

## 2025-04-15 DIAGNOSIS — F41.9 ANXIETY: ICD-10-CM

## 2025-04-15 DIAGNOSIS — J34.2 DEVIATED SEPTUM: ICD-10-CM

## 2025-04-15 DIAGNOSIS — I63.312 CEREBROVASCULAR ACCIDENT (CVA) DUE TO THROMBOSIS OF LEFT MIDDLE CEREBRAL ARTERY (MULTI): ICD-10-CM

## 2025-04-15 DIAGNOSIS — M54.50 ACUTE MIDLINE LOW BACK PAIN WITHOUT SCIATICA: ICD-10-CM

## 2025-04-15 DIAGNOSIS — I10 ESSENTIAL HYPERTENSION: ICD-10-CM

## 2025-04-15 DIAGNOSIS — R10.13 DYSPEPSIA: ICD-10-CM

## 2025-04-15 DIAGNOSIS — R09.82 PND (POST-NASAL DRIP): ICD-10-CM

## 2025-04-15 DIAGNOSIS — F17.200 TOBACCO USE DISORDER: ICD-10-CM

## 2025-04-15 DIAGNOSIS — J98.01 BRONCHOSPASM: ICD-10-CM

## 2025-04-15 DIAGNOSIS — E78.5 DYSLIPIDEMIA: ICD-10-CM

## 2025-04-15 RX ORDER — SERTRALINE HYDROCHLORIDE 100 MG/1
100 TABLET, FILM COATED ORAL DAILY
Qty: 30 TABLET | Refills: 1 | Status: SHIPPED | OUTPATIENT
Start: 2025-04-15 | End: 2025-06-14

## 2025-04-15 RX ORDER — ATORVASTATIN CALCIUM 40 MG/1
40 TABLET, FILM COATED ORAL DAILY
Qty: 30 TABLET | Refills: 1 | Status: SHIPPED | OUTPATIENT
Start: 2025-04-15

## 2025-04-15 RX ORDER — IPRATROPIUM BROMIDE 42 UG/1
SPRAY, METERED NASAL
Qty: 15 ML | Refills: 1 | Status: SHIPPED | OUTPATIENT
Start: 2025-04-15

## 2025-04-15 RX ORDER — LISINOPRIL 20 MG/1
20 TABLET ORAL DAILY
Qty: 30 TABLET | Refills: 1 | Status: SHIPPED | OUTPATIENT
Start: 2025-04-15 | End: 2025-06-14

## 2025-04-15 RX ORDER — METHOCARBAMOL 500 MG/1
500 TABLET, FILM COATED ORAL 2 TIMES DAILY
Qty: 60 TABLET | Refills: 1 | Status: SHIPPED | OUTPATIENT
Start: 2025-04-15 | End: 2025-06-14

## 2025-04-15 RX ORDER — IBUPROFEN 200 MG
1 TABLET ORAL EVERY 24 HOURS
Qty: 28 PATCH | Refills: 1 | Status: SHIPPED | OUTPATIENT
Start: 2025-04-15

## 2025-04-15 RX ORDER — HYDROCHLOROTHIAZIDE 12.5 MG/1
12.5 TABLET ORAL DAILY
Qty: 30 TABLET | Refills: 1 | Status: SHIPPED | OUTPATIENT
Start: 2025-04-15 | End: 2025-06-14

## 2025-04-15 RX ORDER — BUSPIRONE HYDROCHLORIDE 10 MG/1
20 TABLET ORAL 2 TIMES DAILY
Qty: 120 TABLET | Refills: 1 | Status: SHIPPED | OUTPATIENT
Start: 2025-04-15 | End: 2025-06-14

## 2025-04-15 RX ORDER — GABAPENTIN 800 MG/1
800 TABLET ORAL 3 TIMES DAILY
Qty: 90 TABLET | Refills: 1 | Status: SHIPPED | OUTPATIENT
Start: 2025-04-15 | End: 2025-06-14

## 2025-04-15 RX ORDER — FOLIC ACID 1 MG/1
1 TABLET ORAL DAILY
Qty: 30 TABLET | Refills: 1 | Status: SHIPPED | OUTPATIENT
Start: 2025-04-15 | End: 2025-06-14

## 2025-04-15 RX ORDER — FLUTICASONE PROPIONATE 50 MCG
2 SPRAY, SUSPENSION (ML) NASAL DAILY
Qty: 16 G | Refills: 1 | Status: SHIPPED | OUTPATIENT
Start: 2025-04-15 | End: 2025-05-15

## 2025-04-15 RX ORDER — GABAPENTIN 300 MG/1
300 CAPSULE ORAL NIGHTLY
Qty: 30 CAPSULE | Refills: 1 | Status: SHIPPED | OUTPATIENT
Start: 2025-04-15 | End: 2025-06-14

## 2025-04-15 RX ORDER — ASPIRIN 81 MG/1
81 TABLET ORAL DAILY
Qty: 30 TABLET | Refills: 1 | Status: SHIPPED | OUTPATIENT
Start: 2025-04-15

## 2025-04-15 RX ORDER — ALBUTEROL SULFATE 90 UG/1
1-2 INHALANT RESPIRATORY (INHALATION) EVERY 6 HOURS PRN
Qty: 18 G | Refills: 1 | Status: SHIPPED | OUTPATIENT
Start: 2025-04-15

## 2025-04-15 RX ORDER — PANTOPRAZOLE SODIUM 40 MG/1
40 TABLET, DELAYED RELEASE ORAL DAILY
Qty: 30 TABLET | Refills: 1 | Status: SHIPPED | OUTPATIENT
Start: 2025-04-15 | End: 2025-06-14

## 2025-04-15 NOTE — TELEPHONE ENCOUNTER
Patient had an appointment yesterday, no showed.     Called today stating she got lab work done today and claims a doctor told her once she get lab work done, she can get refills.    I suggested for her to reschedule her appointment, she declined.    Please call and further discuss with patient.   No

## 2025-04-15 NOTE — PROGRESS NOTES
Called patient who states she had her blood drawn today and needs refills on her medications. She has previously been told she can only get refills after her blood has been drawn. Labs are not currently collected in the system. Will call lab and see if they are processing although nothing is showing in the EMR. Patient has an appointment in May so will prescribe a course of mediations until then (only controlled substances are gabapentin and robaxin).

## 2025-04-16 LAB
ALBUMIN SERPL-MCNC: 4.3 G/DL (ref 3.6–5.1)
ALP SERPL-CCNC: 65 U/L (ref 37–153)
ALT SERPL-CCNC: 12 U/L (ref 6–29)
ANION GAP SERPL CALCULATED.4IONS-SCNC: 9 MMOL/L (CALC) (ref 7–17)
AST SERPL-CCNC: 15 U/L (ref 10–35)
BASOPHILS # BLD AUTO: 38 CELLS/UL (ref 0–200)
BASOPHILS NFR BLD AUTO: 0.5 %
BILIRUB SERPL-MCNC: 0.3 MG/DL (ref 0.2–1.2)
BUN SERPL-MCNC: 9 MG/DL (ref 7–25)
CALCIUM SERPL-MCNC: 9.7 MG/DL (ref 8.6–10.4)
CHLORIDE SERPL-SCNC: 105 MMOL/L (ref 98–110)
CHOLEST SERPL-MCNC: 181 MG/DL
CHOLEST/HDLC SERPL: 4 (CALC)
CO2 SERPL-SCNC: 28 MMOL/L (ref 20–32)
CREAT SERPL-MCNC: 0.66 MG/DL (ref 0.5–1.03)
EGFRCR SERPLBLD CKD-EPI 2021: 106 ML/MIN/1.73M2
EOSINOPHIL # BLD AUTO: 98 CELLS/UL (ref 15–500)
EOSINOPHIL NFR BLD AUTO: 1.3 %
ERYTHROCYTE [DISTWIDTH] IN BLOOD BY AUTOMATED COUNT: 13 % (ref 11–15)
EST. AVERAGE GLUCOSE BLD GHB EST-MCNC: 134 MG/DL
EST. AVERAGE GLUCOSE BLD GHB EST-SCNC: 7.4 MMOL/L
GLUCOSE SERPL-MCNC: 117 MG/DL (ref 65–99)
HBA1C MFR BLD: 6.3 %
HCT VFR BLD AUTO: 36.8 % (ref 35–45)
HDLC SERPL-MCNC: 45 MG/DL
HGB BLD-MCNC: 12 G/DL (ref 11.7–15.5)
LDLC SERPL CALC-MCNC: 113 MG/DL (CALC)
LYMPHOCYTES # BLD AUTO: 3968 CELLS/UL (ref 850–3900)
LYMPHOCYTES NFR BLD AUTO: 52.9 %
MCH RBC QN AUTO: 29.9 PG (ref 27–33)
MCHC RBC AUTO-ENTMCNC: 32.6 G/DL (ref 32–36)
MCV RBC AUTO: 91.8 FL (ref 80–100)
MONOCYTES # BLD AUTO: 503 CELLS/UL (ref 200–950)
MONOCYTES NFR BLD AUTO: 6.7 %
NEUTROPHILS # BLD AUTO: 2895 CELLS/UL (ref 1500–7800)
NEUTROPHILS NFR BLD AUTO: 38.6 %
NONHDLC SERPL-MCNC: 136 MG/DL (CALC)
PLATELET # BLD AUTO: 454 THOUSAND/UL (ref 140–400)
PMV BLD REES-ECKER: 10.2 FL (ref 7.5–12.5)
POTASSIUM SERPL-SCNC: 4.3 MMOL/L (ref 3.5–5.3)
PROT SERPL-MCNC: 7.6 G/DL (ref 6.1–8.1)
RBC # BLD AUTO: 4.01 MILLION/UL (ref 3.8–5.1)
SODIUM SERPL-SCNC: 142 MMOL/L (ref 135–146)
TRIGL SERPL-MCNC: 118 MG/DL
WBC # BLD AUTO: 7.5 THOUSAND/UL (ref 3.8–10.8)

## 2025-05-27 ENCOUNTER — APPOINTMENT (OUTPATIENT)
Dept: PRIMARY CARE | Facility: HOSPITAL | Age: 50
End: 2025-05-27
Payer: COMMERCIAL

## 2025-05-27 NOTE — PROGRESS NOTES
[facility]    SUBJECTIVE:  CC: 6 month follow-up    HPI: Jud Rhodes is a 49 y.o. 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  She was supposed to be seen within the month for more in-dept follow-up, as for her initial visit she was in a rush and could not stay for further evaluation.    Seen again in office 02/06/2025 for follow-up for medical refill, after failing to do labs as promised in the last visit. She request the previously ordered lab work be collected at a later date, as she needed to  her niece.     There are many phone call interactions with the patient, as documented in her chart, regarding getting these labs collected and medication refill requests.     Per the patient:  ***    Health behaviors:  Diet and exercise:  Dental exams:  Eye exams:    Health maintenance:  Health Maintenance   Topic Date Due    Medicare Annual Wellness Visit (AWV)  Never done    Colorectal Cancer Screening  Never done    MMR Vaccines (1 of 1 - Standard series) Never done    Hepatitis B Vaccines (1 of 3 - 19+ 3-dose series) Never done    Pneumococcal Vaccine (1 of 2 - PCV) Never done    Cervical Cancer Screening  Never done    Mammogram  07/26/2023    Zoster Vaccines (1 of 2) Never done    COVID-19 Vaccine (3 - 2024-25 season) 09/01/2024    DTaP/Tdap/Td Vaccines (2 - Td or Tdap) 05/19/2025    Influenza Vaccine (Season Ended) 09/01/2025    Diabetes: Hemoglobin A1C  04/15/2026    Diabetes Screening  04/15/2026    Lipid Panel  04/15/2030    HIV Screening  Completed    Hepatitis C Screening  Completed    HIB Vaccines  Aged Out    IPV Vaccines  Aged Out    Hepatitis A Vaccines  Aged Out    Meningococcal Vaccine  Aged Out    Rotavirus Vaccines  Aged Out    HPV  Vaccines  Aged Out       Medications:  Current Medications[1]    Require med refills?   Preferred pharmacy:       Past medical history:  Medical History[2]    LMP:  Sexual Hx:    Allergies:  RX Allergies[3]    Surgical history:  Surgical History[4]    Family history:  Family History[5]    Social history:   reports that she has been smoking cigarettes. She has never used smokeless tobacco. She reports that she does not drink alcohol and does not use drugs.    Social History     Social History Narrative    Not on file       1. Living situation:   2. Work:   3. Alcohol:   4. Tobacco:  5. Other drugs:   6. Diet/exercise:     Safety:  - Housing insecurity  - Food insecurity  - Weapons in the home:     Review of systems:  As per HPI      OBJECTIVE:  Vitals:  There were no vitals filed for this visit.    Physical exam:  Constitutional: Well-developed female in no acute distress.  HEENT: NC/AT, sclera anicteric  Respiratory: CTAB. No wheezes, rales, or rhonchi. Normal respiratory effort.  Cardiovascular: RRR. No murmurs, gallops, or rubs.  Abdominal: Soft, nondistended, nontender to palpation. Bowel sounds present. No hepatosplenomegaly or masses.   Neuro: AAOx3. CN II-XII grossly intact. Tongue midline, no facial droop  MSK: No LE edema bilaterally. Moving extremities well  Skin: Warm, dry. No rashes or wounds.  Psych: Appropriate mood and affect.    Labs:  No results found for this or any previous visit (from the past 24 hours).    Imaging:  Imaging  No results found.    Cardiology, Vascular, and Other Imaging  No other imaging results found for the past 7 days      ASSESSMENT and PLAN:  Jud Rhodes is a 49 y.o. 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       #HTN  ::/80 **  Plan:  -Continue lisinopril 20 mg daily, refilled  today  -Continue hydrochlorothiazide 12.5 mg daily, refilled today     #Tobacco Use Disorder  #Bronchospasm  ::Previously reported  smoking 1PPD  -Stopped refilling chantix/nicotine patches as she states that she is not using them   -Low dose CT ordered in the past, not completed  Plan:  -Smoking cessation, she has no interest **  - CT low dose ordered ***         #Meralgia Paresthetica, controlled  #Low back pain, s/p recent fall at home  ::Patient was prescribed Soma previously by unknown physician, not refilled due to side effect profile and addictive potential   -Per HPI, pt experienced recent fall and was seen in ED  - She already started PT   Plan:  -Continue Gabapentin 800 mg TID  -Continue Gabapentin 300 mg at bedtime              -Discontinue baclofen 10 mg TID  -Start methocarbamol 500 mg BID  -Counseled patient to not mix baclofen and methocarbamol together, she must only take one     #HLD  #Hx of CVA, L MCA, stable  -lipid panel 04/2025              -total 181, , HDL 45  Plan:  -Repeat lipid panel, due 04/2026  -Continue atorvastatin 40 mg daily  -Continue ASA 81 mg daily     #Obesity  #Metabolic syndrome  #Pre-diabetes   :: A1C 6.3%. 04/2023  Plan:  -Repeat A1c at next appointment **     #Panic Attacks  #Anxiety  -Sent referral for psychiatry previously, encouraged follow up              -Pt does not currently follow with psych  -Patient reports self stopping quetiapine in the past  -unable to fully discuss at today's visit   Plan:  -Continue Buspirone 15 mg BID  -Continue Sertraline 100 mg daily                 #Health maintenance  - Last HbA1c: 6.3%.  - ASCVD: *** on HIS  - STOP-BANG: Not addressed this visit  - Infectious:  - Hep C Negative 2023   - HIV  Negative 2023   - Vaccinations:  - Tdap 2015   - Flu not addressed this visit   - VZV not addressed this visit   - PNA not addressed this visit   - Colonoscopy: Colonoscopy in 2014, recommended 5 year follow-up. Cologuard ordered Jan 2024 not  completed. overdue  - Pap testing overdue, mammogram overdue  - Low-dose CT chest: overdue  - Bone density: N/a  - AAA screening: N/a  Plan:  -Discussed age-appropriate cancer screening (pap, mammogram, colonoscopy, LDCT)      Follow-up in ***.    Patient and plan discussed with attending physician {[facility] attendings:[id number]}.    Bev Gomez MD  Internal Medicine, PGY-1   [facility]        [1]   Current Outpatient Medications:     albuterol 90 mcg/actuation inhaler, Inhale 1-2 puffs every 6 hours if needed for shortness of breath. 4-6 hours as needed, Disp: 18 g, Rfl: 1    aspirin 81 mg EC tablet, Take 1 tablet (81 mg) by mouth once daily., Disp: 30 tablet, Rfl: 1    atorvastatin (Lipitor) 40 mg tablet, Take 1 tablet (40 mg) by mouth once daily., Disp: 30 tablet, Rfl: 1    buprenorphine-naloxone (Suboxone) 8-2 mg SL film, , Disp: , Rfl:     buprenorphine-naloxone (Suboxone) 8-2 mg SL tablet, Place 1 tablet under the tongue twice a day., Disp: , Rfl:     busPIRone (Buspar) 10 mg tablet, Take 2 tablets (20 mg) by mouth 2 times a day., Disp: 120 tablet, Rfl: 1    fluticasone (Flonase) 50 mcg/actuation nasal spray, Administer 2 sprays into each nostril once daily. Shake gently. Before first use, prime pump. After use, clean tip and replace cap., Disp: 16 g, Rfl: 1    folic acid (Folvite) 1 mg tablet, Take 1 tablet (1 mg) by mouth once daily., Disp: 30 tablet, Rfl: 1    gabapentin (Neurontin) 300 mg capsule, Take 1 capsule (300 mg) by mouth once daily at bedtime., Disp: 30 capsule, Rfl: 1    gabapentin (Neurontin) 800 mg tablet, Take 1 tablet (800 mg) by mouth 3 times a day., Disp: 90 tablet, Rfl: 1    hydroCHLOROthiazide (Microzide) 12.5 mg tablet, Take 1 tablet (12.5 mg) by mouth once daily., Disp: 30 tablet, Rfl: 1    ipratropium (Atrovent) 42 mcg (0.06 %) nasal spray, ADMINISTER TWO (2) SPRAYS INTO EACH NOSTRIL THREE (3) TIMES A DAY., Disp: 15 mL, Rfl: 1    ipratropium-albuteroL  (Duo-Neb) 0.5-2.5 mg/3 mL nebulizer solution, Take 3 mL by nebulization 3 times a day., Disp: 270 mL, Rfl: 0    lidocaine (lidocaine HCL) 4 % cream, Apply topically., Disp: , Rfl:     lisinopril 20 mg tablet, Take 1 tablet (20 mg) by mouth once daily., Disp: 30 tablet, Rfl: 1    methocarbamol (Robaxin) 500 mg tablet, Take 1 tablet (500 mg) by mouth 2 times a day., Disp: 60 tablet, Rfl: 1    multivitamin with minerals (Multiple Vitamin-Minerals) tablet, Take 1 tablet by mouth once daily., Disp: , Rfl:     naloxone (Narcan) 4 mg/0.1 mL nasal spray, Administer 1 spray (4 mg) into affected nostril(s)., Disp: , Rfl:     nicotine (Nicoderm CQ) 21 mg/24 hr patch, Place 1 patch on the skin once every 24 hours., Disp: 28 patch, Rfl: 1    nicotine polacrilex (Nicorette) 2 mg gum, Take 1 each (2 mg) by mouth if needed for smoking cessation., Disp: , Rfl:     pantoprazole (ProtoNix) 40 mg EC tablet, Take 1 tablet (40 mg) by mouth once daily. Do not crush, chew, or split., Disp: 30 tablet, Rfl: 1    polyethylene glycol-electrolytes 420 gram solution, Take 4,000 mL by mouth., Disp: , Rfl:     sertraline (Zoloft) 100 mg tablet, Take 1 tablet (100 mg) by mouth once daily., Disp: 30 tablet, Rfl: 1    SSD 1 % cream, Apply topically once daily. to affected area, Disp: , Rfl:   [2]   Past Medical History:  Diagnosis Date    Alcohol use, unspecified with withdrawal delirium (Multi) 01/14/2021    Alcohol withdrawal syndrome, with delirium    Cerebral infarction due to unspecified occlusion or stenosis of left middle cerebral artery (Multi) 01/14/2021    Cerebral infarction due to occlusion or stenosis of left middle cerebral artery    Cocaine use, unspecified with intoxication delirium (Multi) 01/14/2021    Cocaine intoxication delirium    Other specified symptoms and signs involving the circulatory and respiratory systems 11/22/2021    Choking episode occurring both during daytime and at night   [3] No Known Allergies  [4]   Past  Surgical History:  Procedure Laterality Date    MR CHEST ANGIO WO IV CONTRAST  10/11/2020    MR CHEST ANGIO WO IV CONTRAST 10/11/2020 [facility] CLINICAL LEGACY    OTHER SURGICAL HISTORY  01/15/2021    Knee surgery    OTHER SURGICAL HISTORY  01/15/2021    Wrist surgery    OTHER SURGICAL HISTORY  01/14/2021    Thrombectomy   [5]   Family History  Problem Relation Name Age of Onset    Hypertension Mother

## 2025-06-10 ENCOUNTER — TELEPHONE (OUTPATIENT)
Dept: PRIMARY CARE | Facility: HOSPITAL | Age: 50
End: 2025-06-10

## 2025-06-10 DIAGNOSIS — I10 ESSENTIAL HYPERTENSION: ICD-10-CM

## 2025-06-10 DIAGNOSIS — F41.9 ANXIETY: ICD-10-CM

## 2025-06-10 DIAGNOSIS — R10.13 DYSPEPSIA: ICD-10-CM

## 2025-06-10 DIAGNOSIS — F17.200 TOBACCO USE DISORDER: ICD-10-CM

## 2025-06-10 DIAGNOSIS — E56.9 VITAMIN DEFICIENCY: Primary | ICD-10-CM

## 2025-06-10 DIAGNOSIS — J98.01 BRONCHOSPASM: ICD-10-CM

## 2025-06-10 DIAGNOSIS — J34.2 DEVIATED SEPTUM: ICD-10-CM

## 2025-06-10 DIAGNOSIS — E78.5 DYSLIPIDEMIA: ICD-10-CM

## 2025-06-10 DIAGNOSIS — M54.50 ACUTE MIDLINE LOW BACK PAIN WITHOUT SCIATICA: ICD-10-CM

## 2025-06-10 DIAGNOSIS — R09.82 PND (POST-NASAL DRIP): ICD-10-CM

## 2025-06-10 DIAGNOSIS — I63.312 CEREBROVASCULAR ACCIDENT (CVA) DUE TO THROMBOSIS OF LEFT MIDDLE CEREBRAL ARTERY (MULTI): ICD-10-CM

## 2025-06-10 DIAGNOSIS — M79.2 NEUROPATHIC PAIN: ICD-10-CM

## 2025-06-10 RX ORDER — FLUTICASONE PROPIONATE 50 MCG
2 SPRAY, SUSPENSION (ML) NASAL DAILY
Qty: 16 G | Refills: 1 | Status: SHIPPED | OUTPATIENT
Start: 2025-06-10 | End: 2025-07-10

## 2025-06-10 RX ORDER — PANTOPRAZOLE SODIUM 40 MG/1
40 TABLET, DELAYED RELEASE ORAL DAILY
Qty: 90 TABLET | Refills: 1 | Status: SHIPPED | OUTPATIENT
Start: 2025-06-10 | End: 2025-12-07

## 2025-06-10 RX ORDER — BUSPIRONE HYDROCHLORIDE 10 MG/1
20 TABLET ORAL 2 TIMES DAILY
Qty: 180 TABLET | Refills: 1 | Status: SHIPPED | OUTPATIENT
Start: 2025-06-10 | End: 2025-09-08

## 2025-06-10 RX ORDER — METHOCARBAMOL 500 MG/1
500 TABLET, FILM COATED ORAL 2 TIMES DAILY
Qty: 60 TABLET | Refills: 1 | Status: SHIPPED | OUTPATIENT
Start: 2025-06-10 | End: 2025-08-09

## 2025-06-10 RX ORDER — ASPIRIN 81 MG/1
81 TABLET ORAL DAILY
Qty: 90 TABLET | Refills: 1 | Status: SHIPPED | OUTPATIENT
Start: 2025-06-10

## 2025-06-10 RX ORDER — ATORVASTATIN CALCIUM 40 MG/1
40 TABLET, FILM COATED ORAL DAILY
Qty: 90 TABLET | Refills: 1 | Status: SHIPPED | OUTPATIENT
Start: 2025-06-10

## 2025-06-10 RX ORDER — LISINOPRIL 20 MG/1
20 TABLET ORAL DAILY
Qty: 90 TABLET | Refills: 1 | Status: SHIPPED | OUTPATIENT
Start: 2025-06-10 | End: 2025-12-07

## 2025-06-10 RX ORDER — IPRATROPIUM BROMIDE 42 UG/1
SPRAY, METERED NASAL
Qty: 15 ML | Refills: 1 | Status: SHIPPED | OUTPATIENT
Start: 2025-06-10

## 2025-06-10 RX ORDER — MULTIVITAMIN WITH MINERALS
1 TABLET ORAL DAILY
Qty: 90 TABLET | Refills: 1 | Status: SHIPPED | OUTPATIENT
Start: 2025-06-10

## 2025-06-10 RX ORDER — SERTRALINE HYDROCHLORIDE 100 MG/1
100 TABLET, FILM COATED ORAL DAILY
Qty: 90 TABLET | Refills: 1 | Status: SHIPPED | OUTPATIENT
Start: 2025-06-10 | End: 2025-12-07

## 2025-06-10 RX ORDER — FOLIC ACID 1 MG/1
1 TABLET ORAL DAILY
Qty: 90 TABLET | Refills: 1 | Status: SHIPPED | OUTPATIENT
Start: 2025-06-10 | End: 2025-12-07

## 2025-06-10 RX ORDER — GABAPENTIN 300 MG/1
300 CAPSULE ORAL NIGHTLY
Qty: 90 CAPSULE | Refills: 1 | Status: SHIPPED | OUTPATIENT
Start: 2025-06-10 | End: 2025-12-07

## 2025-06-10 RX ORDER — ALBUTEROL SULFATE 90 UG/1
1-2 INHALANT RESPIRATORY (INHALATION) EVERY 6 HOURS PRN
Qty: 18 G | Refills: 1 | Status: SHIPPED | OUTPATIENT
Start: 2025-06-10

## 2025-06-10 RX ORDER — GABAPENTIN 800 MG/1
800 TABLET ORAL 3 TIMES DAILY
Qty: 180 TABLET | Refills: 1 | Status: SHIPPED | OUTPATIENT
Start: 2025-06-10 | End: 2025-10-08

## 2025-06-10 NOTE — TELEPHONE ENCOUNTER
Patient states she need refills on all her medications. [facility] pharmacy. Patient states she took her last BP pill yesterday

## 2025-07-06 ENCOUNTER — HOSPITAL ENCOUNTER (EMERGENCY)
Facility: HOSPITAL | Age: 50
Discharge: LEFT BEFORE DISCHARGE | End: 2025-07-06
Payer: MEDICARE

## 2025-07-06 VITALS
RESPIRATION RATE: 15 BRPM | SYSTOLIC BLOOD PRESSURE: 118 MMHG | OXYGEN SATURATION: 96 % | HEART RATE: 69 BPM | TEMPERATURE: 97.9 F | DIASTOLIC BLOOD PRESSURE: 75 MMHG

## 2025-07-06 LAB
ALBUMIN SERPL BCP-MCNC: 4.3 G/DL (ref 3.4–5)
ALP SERPL-CCNC: 80 U/L (ref 33–110)
ALT SERPL W P-5'-P-CCNC: 10 U/L (ref 7–45)
ANION GAP SERPL CALC-SCNC: 16 MMOL/L (ref 10–20)
AST SERPL W P-5'-P-CCNC: 26 U/L (ref 9–39)
BASOPHILS # BLD AUTO: 0.02 X10*3/UL (ref 0–0.1)
BASOPHILS NFR BLD AUTO: 0.6 %
BILIRUB SERPL-MCNC: 0.5 MG/DL (ref 0–1.2)
BUN SERPL-MCNC: 16 MG/DL (ref 6–23)
CALCIUM SERPL-MCNC: 10.2 MG/DL (ref 8.6–10.6)
CHLORIDE SERPL-SCNC: 103 MMOL/L (ref 98–107)
CO2 SERPL-SCNC: 20 MMOL/L (ref 21–32)
CREAT SERPL-MCNC: 0.72 MG/DL (ref 0.5–1.05)
EGFRCR SERPLBLD CKD-EPI 2021: >90 ML/MIN/1.73M*2
EOSINOPHIL # BLD AUTO: 0.03 X10*3/UL (ref 0–0.7)
EOSINOPHIL NFR BLD AUTO: 0.9 %
ERYTHROCYTE [DISTWIDTH] IN BLOOD BY AUTOMATED COUNT: 12.7 % (ref 11.5–14.5)
GLUCOSE SERPL-MCNC: 122 MG/DL (ref 74–99)
HCT VFR BLD AUTO: 39 % (ref 36–46)
HGB BLD-MCNC: 13.5 G/DL (ref 12–16)
IMM GRANULOCYTES # BLD AUTO: 0 X10*3/UL (ref 0–0.7)
IMM GRANULOCYTES NFR BLD AUTO: 0 % (ref 0–0.9)
LYMPHOCYTES # BLD AUTO: 1.83 X10*3/UL (ref 1.2–4.8)
LYMPHOCYTES NFR BLD AUTO: 55.1 %
MCH RBC QN AUTO: 29.4 PG (ref 26–34)
MCHC RBC AUTO-ENTMCNC: 34.6 G/DL (ref 32–36)
MCV RBC AUTO: 85 FL (ref 80–100)
MONOCYTES # BLD AUTO: 0.15 X10*3/UL (ref 0.1–1)
MONOCYTES NFR BLD AUTO: 4.5 %
NEUTROPHILS # BLD AUTO: 1.29 X10*3/UL (ref 1.2–7.7)
NEUTROPHILS NFR BLD AUTO: 38.9 %
NRBC BLD-RTO: 0 /100 WBCS (ref 0–0)
PLATELET # BLD AUTO: ABNORMAL 10*3/UL
POTASSIUM SERPL-SCNC: 5.8 MMOL/L (ref 3.5–5.3)
PROT SERPL-MCNC: 8.7 G/DL (ref 6.4–8.2)
RBC # BLD AUTO: 4.59 X10*6/UL (ref 4–5.2)
SODIUM SERPL-SCNC: 133 MMOL/L (ref 136–145)
WBC # BLD AUTO: 3.3 X10*3/UL (ref 4.4–11.3)

## 2025-07-06 PROCEDURE — 36415 COLL VENOUS BLD VENIPUNCTURE: CPT | Performed by: EMERGENCY MEDICINE

## 2025-07-06 PROCEDURE — 80053 COMPREHEN METABOLIC PANEL: CPT | Performed by: EMERGENCY MEDICINE

## 2025-07-06 PROCEDURE — 4500999001 HC ED NO CHARGE

## 2025-07-06 PROCEDURE — 85025 COMPLETE CBC W/AUTO DIFF WBC: CPT | Performed by: EMERGENCY MEDICINE

## 2025-07-06 PROCEDURE — 99283 EMERGENCY DEPT VISIT LOW MDM: CPT

## 2025-07-06 NOTE — ED TRIAGE NOTES
"Patient came to ED with c/o loss of appetite x \"a while\". Pt denies abdominal pain, nausea, vomiting. Hx of CVA, aphasia at baseline.     "